# Patient Record
Sex: FEMALE | Race: WHITE | NOT HISPANIC OR LATINO | URBAN - NONMETROPOLITAN AREA
[De-identification: names, ages, dates, MRNs, and addresses within clinical notes are randomized per-mention and may not be internally consistent; named-entity substitution may affect disease eponyms.]

---

## 2017-07-27 ENCOUNTER — FOLLOW UP ESTABLISHED (OUTPATIENT)
Dept: URBAN - NONMETROPOLITAN AREA CLINIC 6 | Facility: CLINIC | Age: 27
End: 2017-07-27

## 2017-07-27 DIAGNOSIS — H52.13 MYOPIA, BILATERAL: Primary | ICD-10-CM

## 2017-07-27 PROCEDURE — S0621 ROUTINE OPHTHALMOLOGICAL EXA: HCPCS | Performed by: OPTOMETRIST

## 2017-07-27 ASSESSMENT — VISUAL ACUITY
OD: 20/20
OS: 20/20

## 2017-07-27 ASSESSMENT — INTRAOCULAR PRESSURE
OD: 19
OS: 19

## 2018-11-29 VITALS — BODY MASS INDEX: 47.09 KG/M2 | HEIGHT: 66 IN | WEIGHT: 293 LBS

## 2018-11-29 RX ORDER — SODIUM CHLORIDE 0.9 % (FLUSH) 0.9 %
5-10 SYRINGE (ML) INJECTION AS NEEDED
Status: CANCELLED | OUTPATIENT
Start: 2018-11-29

## 2018-11-29 RX ORDER — SODIUM CHLORIDE 0.9 % (FLUSH) 0.9 %
5-10 SYRINGE (ML) INJECTION EVERY 8 HOURS
Status: CANCELLED | OUTPATIENT
Start: 2018-11-29

## 2018-11-30 ENCOUNTER — HOSPITAL ENCOUNTER (OUTPATIENT)
Dept: SURGERY | Age: 28
Discharge: HOME | End: 2018-11-30

## 2018-12-01 PROBLEM — S43.431A SUPERIOR GLENOID LABRUM LESION OF RIGHT SHOULDER: Status: ACTIVE | Noted: 2018-12-01

## 2018-12-01 PROBLEM — S43.401A SPRAIN OF SHOULDER, RIGHT: Status: RESOLVED | Noted: 2018-12-01 | Resolved: 2018-12-01

## 2018-12-01 PROBLEM — S43.401A SPRAIN OF SHOULDER, RIGHT: Status: ACTIVE | Noted: 2018-12-01

## 2018-12-01 PROBLEM — S46.011A TRAUMATIC TEAR OF RIGHT ROTATOR CUFF: Status: ACTIVE | Noted: 2018-12-01

## 2018-12-01 PROBLEM — S46.111A STRAIN OF MUSCLE, FASCIA AND TENDON OF LONG HEAD OF BICEPS, RIGHT ARM, INITIAL ENCOUNTER: Status: ACTIVE | Noted: 2018-12-01

## 2018-12-01 PROBLEM — S49.91XA ACROMIOCLAVICULAR (AC) JOINT INJURY, RIGHT, INITIAL ENCOUNTER: Status: ACTIVE | Noted: 2018-12-01

## 2018-12-01 NOTE — H&P
[facility] HISTORY AND PHYSICAL Subjective:  
 
Patient is a 28 y.o. RHD FEMALE WITH RIGHT SHOULDER PAIN. SEE OFFICE NOTE. Patient Active Problem List  
 Diagnosis Date Noted  Superior glenoid labrum lesion of right shoulder 12/01/2018  Traumatic tear of right rotator cuff 12/01/2018  Strain of muscle, fascia and tendon of long head of biceps, right arm, initial encounter 12/01/2018  Acromioclavicular [facility]) joint injury, right, initial encounter 12/01/2018 No past medical history on file. No past surgical history on file. Prior to Admission medications Not on File Allergies no known allergies Social History Tobacco Use  Smoking status: Not on file Substance Use Topics  Alcohol use: Not on file No family history on file. Review of Systems A comprehensive review of systems was negative except for that written in the HPI. Objective:  
 
No data found. There were no vitals taken for this visit. General:  Alert, cooperative, no distress, appears stated age. Head:  Normocephalic, without obvious abnormality, atraumatic. Back:   Symmetric, no curvature. ROM normal. No CVA tenderness. Lungs:   Clear to auscultation bilaterally. Chest wall:  No tenderness or deformity. Heart:  Regular rate and rhythm, S1, S2 normal, no murmur, click, rub or gallop. Extremities: Extremities normal, atraumatic, no cyanosis or edema. Pulses: 2+ and symmetric all extremities. Skin: Skin color, texture, turgor normal. No rashes or lesions. Lymph nodes: Cervical, supraclavicular, and axillary nodes normal.  
Neurologic: CNII-XII intact. Normal strength, sensation and reflexes throughout. Assessment:  
 
Principal Problem: 
  Traumatic tear of right rotator cuff (12/1/2018) Active Problems: 
  Superior glenoid labrum lesion of right shoulder (12/1/2018) Strain of muscle, fascia and tendon of long head of biceps, right arm, initial encounter (12/1/2018) Acrom[facility]avicular [facility] joint injury, right, initial encounter (12/1/2018) Plan: The various methods of treatment have been discussed with the patient and family. PATIENT HAS EXHAUSTED NON-OPERATIVE MODALITIES After consideration of risks, benefits and other options for treatment, the patient has consented to surgical intervention. SEE OFFICE NOTE Janae Catalan MD

## 2018-12-03 VITALS — WEIGHT: 293 LBS | HEIGHT: 66 IN | BODY MASS INDEX: 47.09 KG/M2

## 2018-12-03 NOTE — PERIOP NOTES
Patient verified name and . Order for consent found in EHR and matches case posting; patient verifies procedure. Type 1B surgery, phone assessment complete. Orders received. Labs per surgeon: none needed pre-op. FBS & HGB-A1C dos. Labs per anesthesia protocol: none needed. Patient answered medical/surgical history questions at their best of ability. All prior to admission medications documented in [facility]. Patient instructed to take the following medications the day of surgery according to anesthesia guidelines with a small sip of water: none. Hold all vitamins 7 days prior to surgery and NSAIDS 5 days prior to surgery. Medications to be held: none. Patient instructed on the following:  Arrive at A Entrance, time of arrival to be called the day before by 1700  NPO after midnight including gum, mints, and ice chips  Responsible adult must drive patient to the hospital, stay during surgery, and patient will need supervision 24 hours after anesthesia  Use dial antibacterial soap in shower 3 nights before surgery and on the morning of surgery  All piercings must be removed prior to arrival.    Leave all valuables (money and jewelry) at home but bring insurance card and ID on DOS. Do not wear make-up, nail polish, lotions, cologne, perfumes, powders, or oil on skin. Patient teach back successful and patient demonstrates knowledge of instruction.

## 2018-12-06 ENCOUNTER — ANESTHESIA EVENT (OUTPATIENT)
Dept: SURGERY | Age: 28
End: 2018-12-06
Payer: OTHER MISCELLANEOUS

## 2018-12-07 ENCOUNTER — ANESTHESIA (OUTPATIENT)
Dept: SURGERY | Age: 28
End: 2018-12-07
Payer: OTHER MISCELLANEOUS

## 2018-12-07 ENCOUNTER — APPOINTMENT (OUTPATIENT)
Dept: GENERAL RADIOLOGY | Age: 28
End: 2018-12-07
Attending: ORTHOPAEDIC SURGERY
Payer: OTHER MISCELLANEOUS

## 2018-12-07 ENCOUNTER — HOSPITAL ENCOUNTER (OUTPATIENT)
Age: 28
Setting detail: OUTPATIENT SURGERY
Discharge: HOME | End: 2018-12-07
Attending: ORTHOPAEDIC SURGERY | Admitting: ORTHOPAEDIC SURGERY
Payer: OTHER MISCELLANEOUS

## 2018-12-07 VITALS
WEIGHT: 293 LBS | HEIGHT: 66 IN | TEMPERATURE: 97.7 F | HEART RATE: 64 BPM | RESPIRATION RATE: 15 BRPM | DIASTOLIC BLOOD PRESSURE: 63 MMHG | SYSTOLIC BLOOD PRESSURE: 107 MMHG | OXYGEN SATURATION: 100 % | BODY MASS INDEX: 47.09 KG/M2

## 2018-12-07 LAB
EST. AVERAGE GLUCOSE BLD GHB EST-MCNC: 100 MG/DL
GLUCOSE BLD STRIP.AUTO-MCNC: 90 MG/DL (ref 65–100)
HBA1C MFR BLD: 5.1 %
HCG UR QL: NEGATIVE

## 2018-12-07 PROCEDURE — 74011250636 HC RX REV CODE- 250/636: Performed by: ANESTHESIOLOGY

## 2018-12-07 PROCEDURE — 77030033073 HC TBNG ARTHSC PMP OUTFLO STRY -B: Performed by: ORTHOPAEDIC SURGERY

## 2018-12-07 PROCEDURE — 82962 GLUCOSE BLOOD TEST: CPT

## 2018-12-07 PROCEDURE — 77030003602 HC NDL NRV BLK BBMI -B: Performed by: ANESTHESIOLOGY

## 2018-12-07 PROCEDURE — 76210000016 HC OR PH I REC 1 TO 1.5 HR: Performed by: ORTHOPAEDIC SURGERY

## 2018-12-07 PROCEDURE — 74011250636 HC RX REV CODE- 250/636: Performed by: ORTHOPAEDIC SURGERY

## 2018-12-07 PROCEDURE — 77030027384 HC PRB ARTHSCP SERFAS STRY -C: Performed by: ORTHOPAEDIC SURGERY

## 2018-12-07 PROCEDURE — 83036 HEMOGLOBIN GLYCOSYLATED A1C: CPT

## 2018-12-07 PROCEDURE — 81025 URINE PREGNANCY TEST: CPT

## 2018-12-07 PROCEDURE — 77030006668 HC BLD SHV MENSCS STRY -B: Performed by: ORTHOPAEDIC SURGERY

## 2018-12-07 PROCEDURE — 77030004453 HC BUR SHV STRY -B: Performed by: ORTHOPAEDIC SURGERY

## 2018-12-07 PROCEDURE — 74011000250 HC RX REV CODE- 250: Performed by: ORTHOPAEDIC SURGERY

## 2018-12-07 PROCEDURE — 74011000250 HC RX REV CODE- 250

## 2018-12-07 PROCEDURE — 77030020782 HC GWN BAIR PAWS FLX 3M -B: Performed by: ANESTHESIOLOGY

## 2018-12-07 PROCEDURE — 74011000250 HC RX REV CODE- 250: Performed by: ANESTHESIOLOGY

## 2018-12-07 PROCEDURE — 74011250637 HC RX REV CODE- 250/637

## 2018-12-07 PROCEDURE — A4565 SLINGS: HCPCS | Performed by: ORTHOPAEDIC SURGERY

## 2018-12-07 PROCEDURE — 77030012935 HC DRSG AQUACEL BMS -B: Performed by: ORTHOPAEDIC SURGERY

## 2018-12-07 PROCEDURE — 77030002916 HC SUT ETHLN J&J -A: Performed by: ORTHOPAEDIC SURGERY

## 2018-12-07 PROCEDURE — 74011250636 HC RX REV CODE- 250/636

## 2018-12-07 PROCEDURE — 77030033005 HC TBNG ARTHSC PMP STRY -B: Performed by: ORTHOPAEDIC SURGERY

## 2018-12-07 PROCEDURE — 77030037088 HC TUBE ENDOTRACH ORAL NSL COVD-A: Performed by: ANESTHESIOLOGY

## 2018-12-07 PROCEDURE — 77030006891 HC BLD SHV RESECT STRY -B: Performed by: ORTHOPAEDIC SURGERY

## 2018-12-07 PROCEDURE — 77030018673: Performed by: ORTHOPAEDIC SURGERY

## 2018-12-07 PROCEDURE — 76060000032 HC ANESTHESIA 0.5 TO 1 HR: Performed by: ORTHOPAEDIC SURGERY

## 2018-12-07 PROCEDURE — 73030 X-RAY EXAM OF SHOULDER: CPT

## 2018-12-07 PROCEDURE — 76010000160 HC OR TIME 0.5 TO 1 HR INTENSV-TIER 1: Performed by: ORTHOPAEDIC SURGERY

## 2018-12-07 PROCEDURE — 77030003666 HC NDL SPINAL BD -A: Performed by: ORTHOPAEDIC SURGERY

## 2018-12-07 PROCEDURE — 77030039425 HC BLD LARYNG TRULITE DISP TELE -A: Performed by: ANESTHESIOLOGY

## 2018-12-07 PROCEDURE — 77030018836 HC SOL IRR NACL ICUM -A: Performed by: ORTHOPAEDIC SURGERY

## 2018-12-07 RX ORDER — NALOXONE HYDROCHLORIDE 0.4 MG/ML
0.1 INJECTION, SOLUTION INTRAMUSCULAR; INTRAVENOUS; SUBCUTANEOUS
Status: DISCONTINUED | OUTPATIENT
Start: 2018-12-07 | End: 2018-12-07 | Stop reason: HOSPADM

## 2018-12-07 RX ORDER — OXYCODONE HYDROCHLORIDE 5 MG/1
5 TABLET ORAL
Status: DISCONTINUED | OUTPATIENT
Start: 2018-12-07 | End: 2018-12-07 | Stop reason: HOSPADM

## 2018-12-07 RX ORDER — SUCCINYLCHOLINE CHLORIDE 20 MG/ML
INJECTION INTRAMUSCULAR; INTRAVENOUS AS NEEDED
Status: DISCONTINUED | OUTPATIENT
Start: 2018-12-07 | End: 2018-12-07 | Stop reason: HOSPADM

## 2018-12-07 RX ORDER — SODIUM CHLORIDE, SODIUM LACTATE, POTASSIUM CHLORIDE, CALCIUM CHLORIDE 600; 310; 30; 20 MG/100ML; MG/100ML; MG/100ML; MG/100ML
75 INJECTION, SOLUTION INTRAVENOUS CONTINUOUS
Status: DISCONTINUED | OUTPATIENT
Start: 2018-12-07 | End: 2018-12-07 | Stop reason: HOSPADM

## 2018-12-07 RX ORDER — ONDANSETRON 2 MG/ML
INJECTION INTRAMUSCULAR; INTRAVENOUS AS NEEDED
Status: DISCONTINUED | OUTPATIENT
Start: 2018-12-07 | End: 2018-12-07 | Stop reason: HOSPADM

## 2018-12-07 RX ORDER — LIDOCAINE HYDROCHLORIDE 10 MG/ML
0.1 INJECTION INFILTRATION; PERINEURAL AS NEEDED
Status: DISCONTINUED | OUTPATIENT
Start: 2018-12-07 | End: 2018-12-07 | Stop reason: HOSPADM

## 2018-12-07 RX ORDER — DIPHENHYDRAMINE HYDROCHLORIDE 50 MG/ML
12.5 INJECTION, SOLUTION INTRAMUSCULAR; INTRAVENOUS
Status: DISCONTINUED | OUTPATIENT
Start: 2018-12-07 | End: 2018-12-07 | Stop reason: HOSPADM

## 2018-12-07 RX ORDER — DEXAMETHASONE SODIUM PHOSPHATE 4 MG/ML
INJECTION, SOLUTION INTRA-ARTICULAR; INTRALESIONAL; INTRAMUSCULAR; INTRAVENOUS; SOFT TISSUE AS NEEDED
Status: DISCONTINUED | OUTPATIENT
Start: 2018-12-07 | End: 2018-12-07 | Stop reason: HOSPADM

## 2018-12-07 RX ORDER — FENTANYL CITRATE 50 UG/ML
INJECTION, SOLUTION INTRAMUSCULAR; INTRAVENOUS AS NEEDED
Status: DISCONTINUED | OUTPATIENT
Start: 2018-12-07 | End: 2018-12-07 | Stop reason: HOSPADM

## 2018-12-07 RX ORDER — PROPOFOL 10 MG/ML
INJECTION, EMULSION INTRAVENOUS AS NEEDED
Status: DISCONTINUED | OUTPATIENT
Start: 2018-12-07 | End: 2018-12-07 | Stop reason: HOSPADM

## 2018-12-07 RX ORDER — FLUMAZENIL 0.1 MG/ML
0.2 INJECTION INTRAVENOUS AS NEEDED
Status: DISCONTINUED | OUTPATIENT
Start: 2018-12-07 | End: 2018-12-07 | Stop reason: HOSPADM

## 2018-12-07 RX ORDER — NEOSTIGMINE METHYLSULFATE 1 MG/ML
INJECTION INTRAVENOUS AS NEEDED
Status: DISCONTINUED | OUTPATIENT
Start: 2018-12-07 | End: 2018-12-07 | Stop reason: HOSPADM

## 2018-12-07 RX ORDER — MIDAZOLAM HYDROCHLORIDE 1 MG/ML
2 INJECTION, SOLUTION INTRAMUSCULAR; INTRAVENOUS
Status: COMPLETED | OUTPATIENT
Start: 2018-12-07 | End: 2018-12-07

## 2018-12-07 RX ORDER — MIDAZOLAM HYDROCHLORIDE 1 MG/ML
2 INJECTION, SOLUTION INTRAMUSCULAR; INTRAVENOUS ONCE
Status: COMPLETED | OUTPATIENT
Start: 2018-12-07 | End: 2018-12-07

## 2018-12-07 RX ORDER — LABETALOL HYDROCHLORIDE 5 MG/ML
INJECTION, SOLUTION INTRAVENOUS AS NEEDED
Status: DISCONTINUED | OUTPATIENT
Start: 2018-12-07 | End: 2018-12-07 | Stop reason: HOSPADM

## 2018-12-07 RX ORDER — LIDOCAINE HYDROCHLORIDE AND EPINEPHRINE 10; 10 MG/ML; UG/ML
INJECTION, SOLUTION INFILTRATION; PERINEURAL AS NEEDED
Status: DISCONTINUED | OUTPATIENT
Start: 2018-12-07 | End: 2018-12-07 | Stop reason: HOSPADM

## 2018-12-07 RX ORDER — GLYCOPYRROLATE 0.2 MG/ML
INJECTION INTRAMUSCULAR; INTRAVENOUS AS NEEDED
Status: DISCONTINUED | OUTPATIENT
Start: 2018-12-07 | End: 2018-12-07 | Stop reason: HOSPADM

## 2018-12-07 RX ORDER — SODIUM CHLORIDE 0.9 % (FLUSH) 0.9 %
5-10 SYRINGE (ML) INJECTION EVERY 8 HOURS
Status: DISCONTINUED | OUTPATIENT
Start: 2018-12-07 | End: 2018-12-07 | Stop reason: HOSPADM

## 2018-12-07 RX ORDER — ROCURONIUM BROMIDE 10 MG/ML
INJECTION, SOLUTION INTRAVENOUS AS NEEDED
Status: DISCONTINUED | OUTPATIENT
Start: 2018-12-07 | End: 2018-12-07 | Stop reason: HOSPADM

## 2018-12-07 RX ORDER — LIDOCAINE HYDROCHLORIDE 20 MG/ML
INJECTION, SOLUTION EPIDURAL; INFILTRATION; INTRACAUDAL; PERINEURAL AS NEEDED
Status: DISCONTINUED | OUTPATIENT
Start: 2018-12-07 | End: 2018-12-07 | Stop reason: HOSPADM

## 2018-12-07 RX ORDER — HYDROMORPHONE HYDROCHLORIDE 2 MG/ML
0.5 INJECTION, SOLUTION INTRAMUSCULAR; INTRAVENOUS; SUBCUTANEOUS
Status: DISCONTINUED | OUTPATIENT
Start: 2018-12-07 | End: 2018-12-07 | Stop reason: HOSPADM

## 2018-12-07 RX ORDER — SODIUM CHLORIDE 0.9 % (FLUSH) 0.9 %
5-10 SYRINGE (ML) INJECTION AS NEEDED
Status: DISCONTINUED | OUTPATIENT
Start: 2018-12-07 | End: 2018-12-07 | Stop reason: HOSPADM

## 2018-12-07 RX ORDER — OXYCODONE HYDROCHLORIDE 5 MG/1
10 TABLET ORAL
Status: DISCONTINUED | OUTPATIENT
Start: 2018-12-07 | End: 2018-12-07 | Stop reason: HOSPADM

## 2018-12-07 RX ORDER — FENTANYL CITRATE 50 UG/ML
100 INJECTION, SOLUTION INTRAMUSCULAR; INTRAVENOUS ONCE
Status: COMPLETED | OUTPATIENT
Start: 2018-12-07 | End: 2018-12-07

## 2018-12-07 RX ORDER — ALBUTEROL SULFATE 90 UG/1
AEROSOL, METERED RESPIRATORY (INHALATION) AS NEEDED
Status: DISCONTINUED | OUTPATIENT
Start: 2018-12-07 | End: 2018-12-07 | Stop reason: HOSPADM

## 2018-12-07 RX ADMIN — FENTANYL CITRATE 100 MCG: 50 INJECTION, SOLUTION INTRAMUSCULAR; INTRAVENOUS at 10:52

## 2018-12-07 RX ADMIN — LIDOCAINE HYDROCHLORIDE 50 MG: 20 INJECTION, SOLUTION EPIDURAL; INFILTRATION; INTRACAUDAL; PERINEURAL at 10:43

## 2018-12-07 RX ADMIN — LABETALOL HYDROCHLORIDE 10 MG: 5 INJECTION, SOLUTION INTRAVENOUS at 10:57

## 2018-12-07 RX ADMIN — MIDAZOLAM HYDROCHLORIDE 2 MG: 1 INJECTION, SOLUTION INTRAMUSCULAR; INTRAVENOUS at 10:41

## 2018-12-07 RX ADMIN — MIDAZOLAM 2 MG: 1 INJECTION INTRAMUSCULAR; INTRAVENOUS at 09:08

## 2018-12-07 RX ADMIN — PROPOFOL 300 MG: 10 INJECTION, EMULSION INTRAVENOUS at 10:43

## 2018-12-07 RX ADMIN — DEXAMETHASONE SODIUM PHOSPHATE 10 MG: 4 INJECTION, SOLUTION INTRA-ARTICULAR; INTRALESIONAL; INTRAMUSCULAR; INTRAVENOUS; SOFT TISSUE at 10:55

## 2018-12-07 RX ADMIN — FAMOTIDINE 20 MG: 10 INJECTION, SOLUTION INTRAVENOUS at 08:28

## 2018-12-07 RX ADMIN — SODIUM CHLORIDE, SODIUM LACTATE, POTASSIUM CHLORIDE, AND CALCIUM CHLORIDE 75 ML/HR: 600; 310; 30; 20 INJECTION, SOLUTION INTRAVENOUS at 08:29

## 2018-12-07 RX ADMIN — GLYCOPYRROLATE 0.8 MG: 0.2 INJECTION INTRAMUSCULAR; INTRAVENOUS at 11:20

## 2018-12-07 RX ADMIN — ONDANSETRON 4 MG: 2 INJECTION INTRAMUSCULAR; INTRAVENOUS at 10:55

## 2018-12-07 RX ADMIN — ALBUTEROL SULFATE 3 PUFF: 90 AEROSOL, METERED RESPIRATORY (INHALATION) at 11:29

## 2018-12-07 RX ADMIN — FENTANYL CITRATE 100 MCG: 50 INJECTION INTRAMUSCULAR; INTRAVENOUS at 09:08

## 2018-12-07 RX ADMIN — ROCURONIUM BROMIDE 35 MG: 10 INJECTION, SOLUTION INTRAVENOUS at 10:51

## 2018-12-07 RX ADMIN — ROCURONIUM BROMIDE 5 MG: 10 INJECTION, SOLUTION INTRAVENOUS at 10:43

## 2018-12-07 RX ADMIN — Medication 3 G: at 10:41

## 2018-12-07 RX ADMIN — SUCCINYLCHOLINE CHLORIDE 200 MG: 20 INJECTION INTRAMUSCULAR; INTRAVENOUS at 10:43

## 2018-12-07 RX ADMIN — PROPOFOL 100 MG: 10 INJECTION, EMULSION INTRAVENOUS at 10:47

## 2018-12-07 RX ADMIN — NEOSTIGMINE METHYLSULFATE 5 MG: 1 INJECTION INTRAVENOUS at 11:20

## 2018-12-07 NOTE — ANESTHESIA PROCEDURE NOTES
Peripheral Block Start time: 12/7/2018 9:09 AM 
End time: 12/7/2018 9:13 AM 
Performed by: India Grant MD 
Authorized by: India Grant MD  
 
 
Pre-procedure: Indications: at surgeon's request and post-op pain management Preanesthetic Checklist: patient identified, risks and benefits discussed, site marked, timeout performed, anesthesia consent given and patient being monitored Timeout Time: 09:08 Block Type:  
Block Type: Interscalene Laterality:  Right Monitoring:  Standard ASA monitoring, continuous pulse ox, frequent vital sign checks, heart rate, oxygen and responsive to questions Injection Technique:  Single shot Procedures: ultrasound guided and nerve stimulator Patient Position: supine (30 degree upright) Prep: chlorhexidine Location:  Interscalene Needle Type:  Stimuplex Needle Gauge:  21 G Needle Localization:  Nerve stimulator and ultrasound guidance Motor Response: minimal motor response >0.4 mA Motor Response comment:  Motor twitch extinguished between 0.2-0.5 mA Assessment: 
Number of attempts:  1 Injection Assessment:  Incremental injection every 5 mL, negative aspiration for CSF, no paresthesia, ultrasound image on chart, local visualized surrounding nerve on ultrasound, negative aspiration for blood and no intravascular symptoms Patient tolerance:  Patient tolerated the procedure well with no immediate complications

## 2018-12-07 NOTE — ANESTHESIA POSTPROCEDURE EVALUATION
Procedure(s): ARTHROSCOPY RIGHT SHOULDER ARTHROSCOPIC DISTAL CLAVICLE RESECTION, EXTENSIVE DEBRIDEMENT GLENOHUMERAL JOINT, SUBACROMIAL SPACE    . Anesthesia Post Evaluation Multimodal analgesia: multimodal analgesia used between 6 hours prior to anesthesia start to PACU discharge Patient location during evaluation: PACU Patient participation: complete - patient participated Level of consciousness: awake Pain management: adequate Airway patency: patent Anesthetic complications: no 
Cardiovascular status: acceptable and hemodynamically stable Respiratory status: acceptable Hydration status: acceptable Comments: Acceptable for discharge from PACU. Visit Vitals /63 Pulse 64 Temp 36.5 °C (97.7 °F) Resp 15 Ht 5' 6\" (1.676 m) Wt (!) 167.4 kg (369 lb) SpO2 100% BMI 59.56 kg/m²

## 2018-12-07 NOTE — ANESTHESIA PREPROCEDURE EVALUATION
Anesthetic History No history of anesthetic complications Review of Systems / Medical History Patient summary reviewed and pertinent labs reviewed Pulmonary Asthma (no meds) : well controlled Neuro/Psych Within defined limits Cardiovascular Within defined limits Exercise tolerance: >4 METS 
  
GI/Hepatic/Renal 
Within defined limits Endo/Other Morbid obesity Other Findings Physical Exam 
 
Airway Mallampati: II 
TM Distance: > 6 cm Neck ROM: normal range of motion Mouth opening: Normal 
 
 Cardiovascular Rhythm: regular Rate: normal 
 
 
 
 Dental 
No notable dental hx Pulmonary Breath sounds clear to auscultation Abdominal 
 
 
 
 Other Findings Anesthetic Plan ASA: 3 Anesthesia type: general 
 
 
Post-op pain plan if not by surgeon: peripheral nerve block single Anesthetic plan and risks discussed with: Patient

## 2018-12-07 NOTE — DISCHARGE INSTRUCTIONS
INSTRUCTIONS FOLLOWING ARTHROSCOPY SURGERY  Dr. Christa Lobato [phone]    ACTIVITY   As tolerated and as directed by your doctor   Elevate surgery site first 48 hours.  Use arm sling or crutches per your doctors instructions.  Bathe or shower as directed by your doctor. DIET   Clear liquids until no nausea or vomiting; then light diet for the first day   Advance to regular diet on second day, unless your doctor orders otherwise.  If nausea and vomiting continues, call your doctor. PAIN   Take pain medication as directed by your doctor.  Call your doctor if pain is NOT relieved by medication.  DO NOT take aspirin or blood thinners until directed by your doctor. DRESSING CARE: Follow all dressing care instructions provided by Dr. Radha Meraz will be made by nursing staff.  If you have any problems or concerns, call your doctor as needed. CALL YOUR DOCTOR IF   Excessive bleeding that does not stop after holding mild pressure over the area   Temperature of 101°F or above   Redness, excessive swelling or bruising, and/or green or yellow, smelly discharge from incision    AFTER ANESTHESIA   For the next 24 hours: DO NOT Drive, Drink alcoholic beverages, or Make important decisions.  Be aware of dizziness following anesthesia and while taking pain medication.

## 2018-12-07 NOTE — H&P
Date of Surgery Update: 
Riley Murphy was seen and examined. History and physical has been reviewed. The patient has been examined.  There have been no significant clinical changes since the completion of the originally dated History and Physical. 
 
Signed By: Azul Morales MD   
 December 7, 2018 7:40 AM

## 2018-12-07 NOTE — OP NOTES
[address] [place] [address] REPORT    Rocky Haque  MR#: [id number]  : 1990  ACCOUNT #: [de-identified]   DATE OF SERVICE: 2018    PREOPERATIVE DIAGNOSES:  1. Partial thickness rotator cuff tear, right shoulder. 2.  Bicipital strain, right shoulder. 3.  Superior labrum anterior and posterior tear, right shoulder. 4.  Acromioclavicular joint pain, right shoulder. 5.  Acromioclavicular joint sprain, right shoulder. POSTOPERATIVE DIAGNOSES:  1. Rotator cuff tendonitis, right shoulder. 2.  Acromioclavicular joint sprain, right shoulder. PROCEDURE PERFORMED:  Arthroscopy, right shoulder, arthroscopic distal clavicle resection, extensive debridement of glenohumeral joint and subacromial space. SURGEON:  Melvyn Kussmaul. Sita Kirkland MD      PATHOLOGY:  1. Type 1 acromion. 2.  Acromioclavicular joint sprain. CPT CODES:  [id number], [id number]. ICD-10 CODES:  P20.345 and S49.91X. ANESTHESIA:  General with interscalene block. ESTIMATED BLOOD LOSS:  10 mL. COMPLICATIONS:  None. INDICATIONS:  The patient is a 74-year-old, right-hand dominant female who injured her right shoulder on the job. Preoperative physical examination, radiographs, and MRI demonstrate what appears to be a partial-thickness rotator cuff tear, bicipital strain, SLAP tear, and AC joint pain, right shoulder. Patient has exhausted nonoperative modalities and is electively admitted for operative intervention. The patient is 370 pounds. PROCEDURE:  Following identification of the patient, the patient was taken to the operative suite. Following administration of general anesthesia, interscalene block for postop pain control, 3 grams of IV Ancef, and measurement of a hemoglobin A1c and fasting blood glucose at 5.1 and 90 respectively, the patient was then positioned on the operative table in the supine fashion.   Her right shoulder was examined under anesthesia and noted to be stable through full range of motion. There was no instability. The patient was then very carefully positioned in the lateral decubitus position, left side down. Axillary roll was placed. Beanbag was inflated. Care was taken to pad both dependent lower and upper extremities. Right arm was then placed in the Certeon traction device in 15 pounds of traction. Right shoulder was then prepped and draped in a sterile fashion. Subacromial space was then injected with 10 mL of 1% Xylocaine with epinephrine. Scope was introduced in the shoulder. Diagnostic arthroscopy then commenced. The articular surfaces of the humeral head and glenoid were visualized and noted to be intact. Anterior, posterior, superior, and inferior labrum was visualized. The anterior, posterior, and inferior bands of the IGHL were intact. The superior labrum was visualized in its entirety and was intact. There was no evidence of any SLAP tear. The biceps itself was pristine and intact. Undersurface of the rotator cuff was visualized including subscapularis, supra- and infraspinatus. Teres minor was intact. The scope was then flip-flopped from the posterior to anterior portal.  Posterior cuff and labrum were intact. Scope was introduced into the subacromial space. Lateral portal was then established. Hypertrophic hemorrhagic bursal tissue was then resected. Bursal side of the cuff was visualized. There was no evidence of full-thickness bursal cuff tear. There was significant hypertrophic, hemorrhagic bursal tissue. CA ligament was pristine. With the use of a 5.54 resector, a complete bursectomy was then performed. Again, the rotator cuff was intact. At this point, attention was then turned to resecting the distal clavicle. The distal 10 mm of distal clavicle was then resected. Care was taken to preserve the posterior-superior capsule. At this point, with the procedure complete, arthroscopic equipment was removed from the shoulder.   The portals were approximated using 2-0 nylon horizontal mattress sutures. Aquacel dressing was applied. A Cryo/Cuff and swathe were applied. The patient was then transferred to the recovery room in stable condition. This injury was secondary to a workplace injury.       MD MARNIE Costa / BOOGIE  D: 12/07/2018 11:39     T: 12/07/2018 15:41  JOB #: [id number]  CC: Brissa Way MD

## 2018-12-10 ENCOUNTER — HOSPITAL ENCOUNTER (OUTPATIENT)
Dept: PHYSICAL THERAPY | Age: 28
End: 2018-12-10
Payer: COMMERCIAL

## 2018-12-13 ENCOUNTER — HOSPITAL ENCOUNTER (OUTPATIENT)
Dept: PHYSICAL THERAPY | Age: 28
Discharge: HOME | End: 2018-12-13
Payer: COMMERCIAL

## 2018-12-13 PROCEDURE — 97140 MANUAL THERAPY 1/> REGIONS: CPT

## 2018-12-13 PROCEDURE — 97161 PT EVAL LOW COMPLEX 20 MIN: CPT

## 2018-12-13 PROCEDURE — 97110 THERAPEUTIC EXERCISES: CPT

## 2018-12-13 NOTE — THERAPY EVALUATION
[employer]  : 1990  Payor: [address] [place] [address] Road / Plan: [employer] [place] / Product Type: Workers Comp /  [address]  at [facility] CAIO M[employer]  [address] [place] [address], [address]acker.  [phone]   [phone]       Carlos Manuel 52 Assessment 2018     ICD-10: Treatment Diagnosis: Pain in right shoulder (M25.511), Stiffness of right shoulder, not elsewhere classified (M25.611)  Precautions/Allergies:   Patient has no known allergies. Fall Risk Score: 0 (? 5 = High Risk)  MD Orders: Evaluate and treat, strengthening, range of motion, home exercise program, \"\"full motion, full strength, no restrictions\"  MEDICAL/REFERRING DIAGNOSIS:  S/P RT Shoulder Arthroscopy, Arthroscopic distal clavicle resection   DATE OF ONSET: 18 (surgery), 3/6/18 (injury)  REFERRING PHYSICIAN: Nadia Sun MD  RETURN PHYSICIAN APPOINTMENT: 18     INITIAL ASSESSMENT:  Ms. Ann-Marie Almazan 6 days s/p R shoulder arthroscopy with distal clavicle resection and debridement of glenohumeral joint. Post-op pain, swelling, wound healing, weakness and decreased ROM are limiting normalized use and function of R UE. She will benefit from PT for guided shoulder rehab per MD order guidelines to promote safe return to normalized use of the UE with daily functional activities. PROBLEM LIST (Impacting functional limitations):  1. Decreased [place] with Home Exercise Program  2. Post-op R shoulder pain and swelling  3. Decreased R shoulder ROM   4. Weakness R shoulder INTERVENTIONS PLANNED:  1. Thermal and electric modalities, manual therapies for pain   2. Manual therapies, therapeutic exercises, HEP for ROM    3. Therapeutic exercises and HEP for strength   TREATMENT PLAN:  Effective Dates: 2018 TO 2018.  Frequency/Duration: 2-3 visits per week for 8 weeks (in anticipation of additional visits ordered by physician at follow up appointments)  GOALS: (Goals have been discussed and agreed upon with patient.)  Short-Term Functional Goals: Time Frame: 4 weeks  1. Report no more than 2-3/10 intermittent pain to R shoulder with compensatory use during basic functional activities, and score less than 60% on the DASH. 2. R shoulder PROM forward elevation greater than 160 degrees and external rotation greater than 90 degrees to progress into functional ranges. 3. Demonstrate good R shoulder isometric strength with manual testing to progress into strength phase. 4. Independent with initial HEP. Discharge Goals: Time Frame: 8 weeks  1. No more than 2-3/10 intermittent pain R shoulder with return to normalized household and work activities, and score less than 20% on the DASH. 2. R shoulder AROM forward elevation greater than 160 degrees, active external rotation greater than T1-2 with functional reaching, and strength to shoulder are grossly WNL's for safe use with normalized activities. 3. Demonstrate good functional shoulder strength and endurance for return to normalized household and work activities. 4. Independent with advanced shoulder HEP for continued self-management. Rehabilitation Potential For Stated Goals: GOOD    Regarding Antonia Love's therapy, I certify that the treatment plan above will be carried out by a therapist or under their direction. Thank you for this referral,    Fracisco Scott PT                   The information in this section was collected on 12-13-18 (except where otherwise noted). HISTORY:   History of Present Injury/Illness (Reason for Referral): Pt reports that she injured her R shoulder while at work on March 6, 2018 when she lifted a box and felt sudden pain in her R shoulder. Attended 12 visits of physical therapy with no relief, and underwent surgery on 12/6/18. Past Medical History/Comorbidities: Ms. Troy Whatley  has a past medical history of Asthma, BMI 50.0-59.9, adult ([address] and Former smoker.   Ms. Troy Whatley  has a past surgical history that includes hx dilation and curettage (2017) and ARTHROSCOPY RIGHT SHOULDER ARTHROSCOPIC DISTAL CLAVICLE RESECTION, EXTENSIVE DEBRIDEMENT GLENOHUMERAL JOINT, SUBACROMIAL SPACE     (Right, 12/7/2018). Social History/Living Environment:  Patient lives alone in a 1-story house usually, but is staying with her mother and siblings in a two-story house while recovering from this surgery. Prior Level of Function/Work/Activity: Patient previously worked full-time for [employer] at [employer]. 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 Dominant Side:         RIGHT  Current Medications:     -Promethazine 25 mg - nausea  - Ketorolac 10 MG - pain  - Hydromorphone 2 MG - pain  - Temazepam 15 MG - sleeping   Date Last Reviewed:  12/13/18   Number of Personal Factors/Comorbidities that affect the Plan of Care: 1-2: MODERATE COMPLEXITY   EXAMINATION:   12-13-18  Observation/Orthostatic Postural Assessment:  Surgical wound to R shoulder appears to be healing well. Sutures are intact, wound edges approximated, and there is no drainage or redness noted. Incisions to R shoulder covered by adhesive bandages but no drainage noticed through the bandage. Arrived to PT without sling on R UE, with R UE in in adducted, internally rotated guarded position with elbow flexion at rest.     Palpation:  Tenderness to R anterior deltoid.      ROM:    L shoulder AROM (standing)   Flexion: 162 degrees   Abduction: 170 degrees   IR with reaching: T5   ER with reaching: T2              R shoulder AROM (standing)   Flexion: 20 degrees      R shoulder PROM (supine)   Flexion: 120 degrees   ABD: 90 degrees   ER (at 45 deg ABD): 60 degrees   IR to beltline         Strength:    L shoulder   Flex 5/5   ABD 5/5   IR (standing, elbow at side) 5/5   ER (standing, elbow at side) 5/5     R shoulder   Isometrics in supine, 4/5 for flexion   Grossly 4/5 for ER/IR with elbow at side         Special Tests: None  Neurological Screen: Motor and sensory to L UE intact. Functional Mobility:  Sit to/from Stand: independent. Bed Mobility: independent. Independent with basic mobility. Requires mod-max A with dressing and grooming ADL's per patient report. Balance:  Not tested        Body Structures Involved:  1. Nerves  2. Bones  3. Joints  4. Muscles Body Functions Affected:  1. Sensory/Pain  2. Neuromusculoskeletal  3. Movement Related  4. Skin Related Activities and Participation Affected:  1. Self Care  2. Interpersonal Interactions and Relationships  3. Community, Social and [place]   Number of elements (examined above) that affect the Plan of Care: 4+: HIGH COMPLEXITY   CLINICAL PRESENTATION:   Presentation: Stable and uncomplicated: LOW COMPLEXITY   CLINICAL DECISION MAKING:   Outcome Measure: Tool Used: Disabilities of the Arm, Shoulder and Hand (DASH) Questionnaire - Quick Version  Score:  Initial: 53/55 or 95% limited (12-13-18) Most Recent: X/55 (Date: -- )   Interpretation of Score: The DASH is designed to measure the activities of daily living in person's with upper extremity dysfunction or pain. Each section is scored on a 1-5 scale, 5 representing the greatest disability. The scores of each section are added together for a total score of 55. This number is divided by 11, followed by subtracting 1 and multiplying by 25 to get a percent score of disability. This value represents the percentage disability: 0-20% minimal disability; 20-40% moderate disability; 40-60% severe disability; % dependent for care or exaggerated symptom behavior. Minimal detectable change is 12%. Score 11 12-19 20-28 29-37 38-45 46-54 55   Modifier CH CI CJ CK CL CM CN     Medical Necessity:   · Skilled intervention continues to be required due to R shoulder pain s/p R shoulder arthroscopy. Reason for Services/Other Comments:  · Patient continues to require skilled intervention due to R shoulder pain s/p R shoulder arthroscopy.    Use of outcome tool(s) and clinical judgement create a POC that gives a: Clear prediction of patient's progress: LOW COMPLEXITY            TREATMENT:   (In addition to Assessment/Re-Assessment sessions the following treatments were rendered)  Pre-treatment Symptoms/Complaints:  States that she has been in a lot of pain since the surgery, and has been living with her mother for assistance with ADLs. Has been sleeping in a recliner but has found it difficult to sleep. Pain: Initial:   6/10 Post Session:  No VAS, denied ice after PT opting to ice at home     Manual Therapy (15 minutes) to improve R shoulder ROM. Grade 2-3 physio mobilizations in supine for flexion, abduction, IR and ER performed with R UE fully supported in scapular plane. Therapeutic Exercise ( 9 minutes) to improve R upper extremity function. Isometrics performed for flexion, abduction, IR and ER performed in supine in scapular plane. Reviewed patient's HEP and instructed patient on closed-chain walk-backs for improved shoulder flexion and isometrics in standing for improved R UE muscle activation. HEP: Provided written HEP for isometrics, wall walks/slides, scapular retractions and elbow flexion/extension and also use of ice for pain and swelling. . Patient verbalizes understanding. Treatment/Session Assessment:    · Response to Treatment:  Patient very guarded initially with passive motion to R shoulder but this declined and patient was able to attain functional passive ranges during PT.  · Compliance with Program/Exercises: Will assess as treatment progresses  · Recommendations/Intent for next treatment session: \"Next visit will focus on improving strength and function of R UE\".    Total Treatment Duration: 45 Minutes  PT Patient Time In/Time Out  Time In: 1415  Time Out: [facility], PT

## 2018-12-14 NOTE — PROGRESS NOTES
Ambulatory/Rehab Services H2 Model Falls Risk Assessment    Risk Factors:       No Risk Factors Identified Ability to Rise from Chair:       (0)  Ability to rise in a single movement    Falls Prevention Plan:       No modifications necessary   Total: (5 or greater = High Risk): 0    ©2010 [facility] of [employer]. All Rights Reserved. [facility] Patent #[id number].  Federal Law prohibits the replication, distribution or use without written permission from [facility] [employer]

## 2018-12-20 ENCOUNTER — HOSPITAL ENCOUNTER (OUTPATIENT)
Dept: PHYSICAL THERAPY | Age: 28
Discharge: HOME | End: 2018-12-20
Payer: COMMERCIAL

## 2018-12-20 PROCEDURE — 97140 MANUAL THERAPY 1/> REGIONS: CPT

## 2018-12-20 PROCEDURE — 97110 THERAPEUTIC EXERCISES: CPT

## 2018-12-20 NOTE — PROGRESS NOTES
[employer]  : 1990  Payor: [address] [place] [address] Road / Plan: David Iglesias / Product Type: Workers Comp /  [address]  at [facility]  [address] [place] [address], 4 Tila Thacker.  [phone]   [phone]       OUTPATIENT PHYSICAL [address] Note 2018     ICD-10: Treatment Diagnosis: Pain in right shoulder (M25.511), Stiffness of right shoulder, not elsewhere classified (M25.611)  Precautions/Allergies:   Patient has no known allergies. Fall Risk Score: 0 (? 5 = High Risk)  MD Orders: Evaluate and treat, strengthening, range of motion, home exercise program, \"\"full motion, full strength, no restrictions\"  MEDICAL/REFERRING DIAGNOSIS:  S/P RT Shoulder Arthroscopy, Arthroscopic distal clavicle resection   DATE OF ONSET: 18 (surgery), 3/6/18 (injury)  REFERRING PHYSICIAN: Ike Barry MD  RETURN PHYSICIAN APPOINTMENT: 2019     INITIAL ASSESSMENT:  Ms. Jim Olszewski 6 days s/p R shoulder arthroscopy with distal clavicle resection and debridement of glenohumeral joint. Post-op pain, swelling, wound healing, weakness and decreased ROM are limiting normalized use and function of R UE. She will benefit from PT for guided shoulder rehab per MD order guidelines to promote safe return to normalized use of the UE with daily functional activities. PROBLEM LIST (Impacting functional limitations):  1. Decreased [place] with Home Exercise Program  2. Post-op R shoulder pain and swelling  3. Decreased R shoulder ROM   4. Weakness R shoulder INTERVENTIONS PLANNED:  1. Thermal and electric modalities, manual therapies for pain   2. Manual therapies, therapeutic exercises, HEP for ROM    3. Therapeutic exercises and HEP for strength   TREATMENT PLAN:  Effective Dates: 2018 TO 2018.  Frequency/Duration: 2-3 visits per week for 8 weeks (in anticipation of additional visits ordered by physician at follow up appointments)  GOALS: (Goals have been discussed and agreed upon with patient.)  Short-Term Functional Goals: Time Frame: 4 weeks  1. Report no more than 2-3/10 intermittent pain to R shoulder with compensatory use during basic functional activities, and score less than 60% on the DASH. 2. R shoulder PROM forward elevation greater than 160 degrees and external rotation greater than 90 degrees to progress into functional ranges. 3. Demonstrate good R shoulder isometric strength with manual testing to progress into strength phase. 4. Independent with initial HEP. Discharge Goals: Time Frame: 8 weeks  1. No more than 2-3/10 intermittent pain R shoulder with return to normalized household and work activities, and score less than 20% on the DASH. 2. R shoulder AROM forward elevation greater than 160 degrees, active external rotation greater than T1-2 with functional reaching, and strength to shoulder are grossly WNL's for safe use with normalized activities. 3. Demonstrate good functional shoulder strength and endurance for return to normalized household and work activities. 4. Independent with advanced shoulder HEP for continued self-management. Rehabilitation Potential For Stated Goals: GOOD                The information in this section was collected on 12-13-18 (except where otherwise noted). HISTORY:   History of Present Injury/Illness (Reason for Referral): Pt reports that she injured her R shoulder while at work on March 6, 2018 when she lifted a box and felt sudden pain in her R shoulder. Attended 12 visits of physical therapy with no relief, and underwent surgery on 12/6/18. Past Medical History/Comorbidities: Ms. Triston Gomes  has a past medical history of Asthma, BMI 50.0-59.9, adult ([address] and Former smoker. Ms. Triston Gomes  has a past surgical history that includes hx dilation and curettage (2017) and ARTHROSCOPY RIGHT SHOULDER ARTHROSCOPIC DISTAL CLAVICLE RESECTION, EXTENSIVE DEBRIDEMENT GLENOHUMERAL JOINT, SUBACROMIAL SPACE     (Right, 12/7/2018).   Social History/Living Environment:  Patient lives alone in a 1-story house usually, but is staying with her mother and siblings in a two-story house while recovering from this surgery. Prior Level of Function/Work/Activity: Patient previously worked full-time for [employer] at [employer]. 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 Dominant Side:         RIGHT  Current Medications:     -Promethazine 25 mg - nausea  - Ketorolac 10 MG - pain  - Hydromorphone 2 MG - pain  - Temazepam 15 MG - sleeping   Date Last Reviewed:  12/13/18   Number of Personal Factors/Comorbidities that affect the Plan of Care: 1-2: MODERATE COMPLEXITY   EXAMINATION:   12-13-18  Observation/Orthostatic Postural Assessment:  Surgical wound to R shoulder appears to be healing well. Sutures are intact, wound edges approximated, and there is no drainage or redness noted. Incisions to R shoulder covered by adhesive bandages but no drainage noticed through the bandage. Arrived to PT without sling on R UE, with R UE in in adducted, internally rotated guarded position with elbow flexion at rest.     Palpation:  Tenderness to R anterior deltoid. ROM:    L shoulder AROM (standing)   Flexion: 162 degrees   Abduction: 170 degrees   IR with reaching: T5   ER with reaching: T2              R shoulder AROM (standing)   Flexion: 20 degrees      R shoulder PROM (supine)   Flexion: 120 degrees   ABD: 90 degrees   ER (at 45 deg ABD): 60 degrees   IR to beltline         Strength:    L shoulder   Flex 5/5   ABD 5/5   IR (standing, elbow at side) 5/5   ER (standing, elbow at side) 5/5     R shoulder   Isometrics in supine, 4/5 for flexion   Grossly 4/5 for ER/IR with elbow at side         Special Tests: None  Neurological Screen: Motor and sensory to L UE intact. Functional Mobility:  Sit to/from Stand: independent. Bed Mobility: independent. Independent with basic mobility.   Requires mod-max A with dressing and grooming ADL's per patient report. Balance:  Not tested        Body Structures Involved:  1. Nerves  2. Bones  3. Joints  4. Muscles Body Functions Affected:  1. Sensory/Pain  2. Neuromusculoskeletal  3. Movement Related  4. Skin Related Activities and Participation Affected:  1. Self Care  2. Interpersonal Interactions and Relationships  3. Community, Social and [place]   Number of elements (examined above) that affect the Plan of Care: 4+: HIGH COMPLEXITY   CLINICAL PRESENTATION:   Presentation: Stable and uncomplicated: LOW COMPLEXITY   CLINICAL DECISION MAKING:   Outcome Measure: Tool Used: Disabilities of the Arm, Shoulder and Hand (DASH) Questionnaire - Quick Version  Score:  Initial: 53/55 or 95% limited (12-13-18) Most Recent: X/55 (Date: -- )   Interpretation of Score: The DASH is designed to measure the activities of daily living in person's with upper extremity dysfunction or pain. Each section is scored on a 1-5 scale, 5 representing the greatest disability. The scores of each section are added together for a total score of 55. This number is divided by 11, followed by subtracting 1 and multiplying by 25 to get a percent score of disability. This value represents the percentage disability: 0-20% minimal disability; 20-40% moderate disability; 40-60% severe disability; % dependent for care or exaggerated symptom behavior. Minimal detectable change is 12%. Score 11 12-19 20-28 29-37 38-45 46-54 55   Modifier CH CI CJ CK CL CM CN     Medical Necessity:   · Skilled intervention continues to be required due to R shoulder pain s/p R shoulder arthroscopy. Reason for Services/Other Comments:  · Patient continues to require skilled intervention due to R shoulder pain s/p R shoulder arthroscopy.    Use of outcome tool(s) and clinical judgement create a POC that gives a: Clear prediction of patient's progress: LOW COMPLEXITY            TREATMENT:   (In addition to Assessment/Re-Assessment sessions the following treatments were rendered)  Pre-treatment Symptoms/Complaints: Had stitches removed yesterday at visit with Dr. Sonali Nunez. Missed her previous treatment due to transportation issues, but reports that those have been resolved. Pain: Initial:   7/10 Post Session:  7/10, provided with ice at end of PT     Manual Therapy (10 minutes) to improve R shoulder ROM. Grade 3-4 physio mobilizations in supine for flexion, abduction, IR and ER performed with R UE fully supported in scapular plane. Therapeutic Exercise ( 30 minutes) to improve R upper extremity function. Isometrics performed for flexion, abduction, IR and ER performed in supine in scapular plane. Reviewed patient's HEP and instructed patient on closed-chain walk-backs for improved shoulder flexion and isometrics in standing for improved R UE muscle activation. Scapular diagonals with active-assisted motion from therapist, no manual resistance applied. Date:  12-20-18 Date:   Date:     Activity/Exercise Parameters Parameters Parameters   Flexion Supine x 10 through full ROM    Semi-reclined  2 x 10 active     Supine press 0# 3 x 10     External rotations 0# x 10  2# 2 x 12       Abduction Side-lying 3 x 10, 0#     Prone rows 3 x 10 0#     Scapular retractions Blue, elbows flex 2 x 15 B    Black, elbows ext  2 x 10 B    Prone extension, 3 x 10      AAROM UE [place] 2 x 10    Wall slides, forearm neutral 2 x 10            HEP: No changes to HEP    Treatment/Session Assessment:    · Response to Treatment:  Pt demonstrates significantly improved ROM as opposed to initial evaluation. Tolerated all strengthening moves well with only minimal discomfort. · Compliance with Program/Exercises: Compliant  · Recommendations/Intent for next treatment session: \"Next visit will focus on improving strength and function of R UE\".    Total Treatment Duration: 40 Minutes  PT Patient Time In/Time Out  Time In: 1045  Time Out: [address], PT

## 2018-12-21 ENCOUNTER — HOSPITAL ENCOUNTER (OUTPATIENT)
Dept: PHYSICAL THERAPY | Age: 28
Discharge: HOME | End: 2018-12-21
Payer: COMMERCIAL

## 2018-12-21 PROCEDURE — 97140 MANUAL THERAPY 1/> REGIONS: CPT

## 2018-12-21 PROCEDURE — 97110 THERAPEUTIC EXERCISES: CPT

## 2018-12-21 NOTE — PROGRESS NOTES
[employer]  : 1990  Payor: [address] [place] [address] Road / Plan: Jarad Begin / Product Type: Workers Comp /  [address]  at [facility] [employer]  [address] [place] [address], 4 Tila Th[address].  [phone]   [phone]       OUTPATIENT PHYSICAL [address] Note 2018     ICD-10: Treatment Diagnosis: Pain in right shoulder (M25.511), Stiffness of right shoulder, not elsewhere classified (M25.611)  Precautions/Allergies:   Patient has no known allergies. Fall Risk Score: 0 (? 5 = High Risk)  MD Orders: Evaluate and treat, strengthening, range of motion, home exercise program, \"\"full motion, full strength, no restrictions\"  MEDICAL/REFERRING DIAGNOSIS:  S/P RT Shoulder Arthroscopy, Arthroscopic distal clavicle resection   DATE OF ONSET: 18 (surgery), 3/6/18 (injury)  REFERRING PHYSICIAN: Jeremy Angeles MD  RETURN PHYSICIAN APPOINTMENT: 2019     INITIAL ASSESSMENT:  Ms. Orlando Lob 6 days s/p R shoulder arthroscopy with distal clavicle resection and debridement of glenohumeral joint. Post-op pain, swelling, wound healing, weakness and decreased ROM are limiting normalized use and function of R UE. She will benefit from PT for guided shoulder rehab per MD order guidelines to promote safe return to normalized use of the UE with daily functional activities. PROBLEM LIST (Impacting functional limitations):  1. Decreased [place] with Home Exercise Program  2. Post-op R shoulder pain and swelling  3. Decreased R shoulder ROM   4. Weakness R shoulder INTERVENTIONS PLANNED:  1. Thermal and electric modalities, manual therapies for pain   2. Manual therapies, therapeutic exercises, HEP for ROM    3. Therapeutic exercises and HEP for strength   TREATMENT PLAN:  Effective Dates: 2018 TO 2018.  Frequency/Duration: 2-3 visits per week for 8 weeks (in anticipation of additional visits ordered by physician at follow up appointments)  GOALS: (Goals have been discussed and agreed upon with patient.)  Short-Term Functional Goals: Time Frame: 4 weeks  1. Report no more than 2-3/10 intermittent pain to R shoulder with compensatory use during basic functional activities, and score less than 60% on the DASH. 2. R shoulder PROM forward elevation greater than 160 degrees and external rotation greater than 90 degrees to progress into functional ranges. 3. Demonstrate good R shoulder isometric strength with manual testing to progress into strength phase. 4. Independent with initial HEP. Discharge Goals: Time Frame: 8 weeks  1. No more than 2-3/10 intermittent pain R shoulder with return to normalized household and work activities, and score less than 20% on the DASH. 2. R shoulder AROM forward elevation greater than 160 degrees, active external rotation greater than T1-2 with functional reaching, and strength to shoulder are grossly WNL's for safe use with normalized activities. 3. Demonstrate good functional shoulder strength and endurance for return to normalized household and work activities. 4. Independent with advanced shoulder HEP for continued self-management. Rehabilitation Potential For Stated Goals: GOOD                The information in this section was collected on 12-13-18 (except where otherwise noted). HISTORY:   History of Present Injury/Illness (Reason for Referral): Pt reports that she injured her R shoulder while at work on March 6, 2018 when she lifted a box and felt sudden pain in her R shoulder. Attended 12 visits of physical therapy with no relief, and underwent surgery on 12/6/18. Past Medical History/Comorbidities: Ms. Arnold Gold  has a past medical history of Asthma, BMI 50.0-59.9, adult ([address] and Former smoker. Ms. Arnold Gold  has a past surgical history that includes hx dilation and curettage (2017) and ARTHROSCOPY RIGHT SHOULDER ARTHROSCOPIC DISTAL CLAVICLE RESECTION, EXTENSIVE DEBRIDEMENT GLENOHUMERAL JOINT, SUBACROMIAL SPACE     (Right, 12/7/2018).   Social History/Living Environment:  Patient lives alone in a 1-story house usually, but is staying with her mother and siblings in a two-story house while recovering from this surgery. Prior Level of Function/Work/Activity: Patient previously worked full-time for [employer] at [employer]. 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 Dominant Side:         RIGHT  Current Medications:     -Promethazine 25 mg - nausea  - Ketorolac 10 MG - pain  - Hydromorphone 2 MG - pain  - Temazepam 15 MG - sleeping   Date Last Reviewed:  12/13/18   Number of Personal Factors/Comorbidities that affect the Plan of Care: 1-2: MODERATE COMPLEXITY   EXAMINATION:   12-13-18  Observation/Orthostatic Postural Assessment:  Surgical wound to R shoulder appears to be healing well. Sutures are intact, wound edges approximated, and there is no drainage or redness noted. Incisions to R shoulder covered by adhesive bandages but no drainage noticed through the bandage. Arrived to PT without sling on R UE, with R UE in in adducted, internally rotated guarded position with elbow flexion at rest.     Palpation:  Tenderness to R anterior deltoid. ROM:    L shoulder AROM (standing)   Flexion: 162 degrees   Abduction: 170 degrees   IR with reaching: T5   ER with reaching: T2              R shoulder AROM (standing)   Flexion: 20 degrees      R shoulder PROM (supine)   Flexion: 120 degrees   ABD: 90 degrees   ER (at 45 deg ABD): 60 degrees   IR to beltline         Strength:    L shoulder   Flex 5/5   ABD 5/5   IR (standing, elbow at side) 5/5   ER (standing, elbow at side) 5/5     R shoulder   Isometrics in supine, 4/5 for flexion   Grossly 4/5 for ER/IR with elbow at side         Special Tests: None  Neurological Screen: Motor and sensory to L UE intact. Functional Mobility:  Sit to/from Stand: independent. Bed Mobility: independent. Independent with basic mobility.   Requires mod-max A with dressing and grooming ADL's per patient report. Balance:  Not tested        Body Structures Involved:  1. Nerves  2. Bones  3. Joints  4. Muscles Body Functions Affected:  1. Sensory/Pain  2. Neuromusculoskeletal  3. Movement Related  4. Skin Related Activities and Participation Affected:  1. Self Care  2. Interpersonal Interactions and Relationships  3. Community, Social and [place]   Number of elements (examined above) that affect the Plan of Care: 4+: HIGH COMPLEXITY   CLINICAL PRESENTATION:   Presentation: Stable and uncomplicated: LOW COMPLEXITY   CLINICAL DECISION MAKING:   Outcome Measure: Tool Used: Disabilities of the Arm, Shoulder and Hand (DASH) Questionnaire - Quick Version  Score:  Initial: 53/55 or 95% limited (12-13-18) Most Recent: X/55 (Date: -- )   Interpretation of Score: The DASH is designed to measure the activities of daily living in person's with upper extremity dysfunction or pain. Each section is scored on a 1-5 scale, 5 representing the greatest disability. The scores of each section are added together for a total score of 55. This number is divided by 11, followed by subtracting 1 and multiplying by 25 to get a percent score of disability. This value represents the percentage disability: 0-20% minimal disability; 20-40% moderate disability; 40-60% severe disability; % dependent for care or exaggerated symptom behavior. Minimal detectable change is 12%. Score 11 12-19 20-28 29-37 38-45 46-54 55   Modifier CH CI CJ CK CL CM CN     Medical Necessity:   · Skilled intervention continues to be required due to R shoulder pain s/p R shoulder arthroscopy. Reason for Services/Other Comments:  · Patient continues to require skilled intervention due to R shoulder pain s/p R shoulder arthroscopy.    Use of outcome tool(s) and clinical judgement create a POC that gives a: Clear prediction of patient's progress: LOW COMPLEXITY            TREATMENT:   (In addition to Assessment/Re-Assessment sessions the following treatments were rendered)  Pre-treatment Symptoms/Complaints: Reports some pain in front of R shoulder today, which concerns her as that is where she was feeling pain prior to the surgery. Pain: Initial:   7/10 Post Session:  7/10 - given ice at end of PT     Manual Therapy (12 minutes) to improve R shoulder ROM. Grade 3-4 physio mobilizations in supine for flexion, abduction, IR and ER performed with R UE fully supported in scapular plane. Therapeutic Exercise ( 29 minutes) to improve R upper extremity function. Isometrics performed for flexion, abduction, IR and ER performed in supine in scapular plane. .   Date:  12-20-18 Date:  12-21-18 Date:     Activity/Exercise Parameters Parameters Parameters   Flexion Supine x 10 through full ROM    Semi-reclined  2 x 10 active Supine 2 x 10 through full ROM    Standing to 90 degrees, 3 x 8    Supine press 0# 3 x 10 0# 3 x 10     External rotations 0# x 10  2# 2 x 12   0# x 10  1# x 10  2# x 10    Green, 3 x 10 single band    Abduction Side-lying 3 x 10, 0# Side-lying  1# 3 x 10     Prone rows 3 x 10 0# 2# 3 x 10    Scapular retractions Blue, elbows flex 2 x 15 B    Black, elbows ext  2 x 10 B    Prone extension, 3 x 10  Black, elbows flexed 3 x 10 B    Black, elbows ext 2 x 10 B    Prone extension 3 x 10    AAROM UE [place] 2 x 10    Wall slides, forearm neutral 2 x 10 Wall slides, forearm neutral 1 x 12            HEP: Added standing shoulder flexion, side-lying abductions and band-resisted scapular retractions to improve R shoulder function. Pt verbalized understanding. Treatment/Session Assessment:    · Response to Treatment:  Pt continues to improve with range of motion, and was able to reach approximately 110 degrees of flexion actively in standing. Patient making progress, but limited by discomfort anteriorly today. · Compliance with Program/Exercises: Compliant  · Recommendations/Intent for next treatment session:  \"Next visit will focus on improving strength and function of R UE\".    Total Treatment Duration: 40 Minutes  PT Patient Time In/Time Out  Time In: 1000  Time Out: [address], PT

## 2018-12-27 ENCOUNTER — HOSPITAL ENCOUNTER (OUTPATIENT)
Dept: PHYSICAL THERAPY | Age: 28
Discharge: HOME | End: 2018-12-27
Payer: COMMERCIAL

## 2018-12-27 PROCEDURE — 97140 MANUAL THERAPY 1/> REGIONS: CPT

## 2018-12-27 PROCEDURE — 97110 THERAPEUTIC EXERCISES: CPT

## 2018-12-27 NOTE — PROGRESS NOTES
[employer]  : 1990  Payor: [address] [place] [address] / Plan: Jacinta Coughlin / Product Type: Workers Comp /  [id number]  at [facility]  [address] [place] [address], [address].  [phone]   [phone]       OUTPATIENT PHYSICAL [address] Note 2018     ICD-10: Treatment Diagnosis: Pain in right shoulder (M25.511), Stiffness of right shoulder, not elsewhere classified (M25.611)  Precautions/Allergies:   Patient has no known allergies. Fall Risk Score: 0 (? 5 = High Risk)  MD Orders: Evaluate and treat, strengthening, range of motion, home exercise program, \"\"full motion, full strength, no restrictions\"  MEDICAL/REFERRING DIAGNOSIS:  S/P RT Shoulder Arthroscopy, Arthroscopic distal clavicle resection   DATE OF ONSET: 18 (surgery), 3/6/18 (injury)  REFERRING PHYSICIAN: Irvin Ulloa MD  RETURN PHYSICIAN APPOINTMENT: 2019     INITIAL ASSESSMENT:  Ms. Kat Momin 6 days s/p R shoulder arthroscopy with distal clavicle resection and debridement of glenohumeral joint. Post-op pain, swelling, wound healing, weakness and decreased ROM are limiting normalized use and function of R UE. She will benefit from PT for guided shoulder rehab per MD order guidelines to promote safe return to normalized use of the UE with daily functional activities. PROBLEM LIST (Impacting functional limitations):  1. Decreased [place] with Home Exercise Program  2. Post-op R shoulder pain and swelling  3. Decreased R shoulder ROM   4. Weakness R shoulder INTERVENTIONS PLANNED:  1. Thermal and electric modalities, manual therapies for pain   2. Manual therapies, therapeutic exercises, HEP for ROM    3. Therapeutic exercises and HEP for strength   TREATMENT PLAN:  Effective Dates: 2018 TO 2018.  Frequency/Duration: 2-3 visits per week for 8 weeks (in anticipation of additional visits ordered by physician at follow up appointments)  GOALS: (Goals have been discussed and agreed upon with patient.)  Short-Term Functional Goals: Time Frame: 4 weeks  1. Report no more than 2-3/10 intermittent pain to R shoulder with compensatory use during basic functional activities, and score less than 60% on the DASH. 2. R shoulder PROM forward elevation greater than 160 degrees and external rotation greater than 90 degrees to progress into functional ranges. 3. Demonstrate good R shoulder isometric strength with manual testing to progress into strength phase. 4. Independent with initial HEP. Discharge Goals: Time Frame: 8 weeks  1. No more than 2-3/10 intermittent pain R shoulder with return to normalized household and work activities, and score less than 20% on the DASH. 2. R shoulder AROM forward elevation greater than 160 degrees, active external rotation greater than T1-2 with functional reaching, and strength to shoulder are grossly WNL's for safe use with normalized activities. 3. Demonstrate good functional shoulder strength and endurance for return to normalized household and work activities. 4. Independent with advanced shoulder HEP for continued self-management. Rehabilitation Potential For Stated Goals: GOOD                The information in this section was collected on 12-13-18 (except where otherwise noted). HISTORY:   History of Present Injury/Illness (Reason for Referral): Pt reports that she injured her R shoulder while at work on March 6, 2018 when she lifted a box and felt sudden pain in her R shoulder. Attended 12 visits of physical therapy with no relief, and underwent surgery on 12/6/18. Past Medical History/Comorbidities: Ms. Hubert Bravo  has a past medical history of Asthma, BMI 50.0-59.9, adult ([address] and Former smoker. Ms. Hubert Bravo  has a past surgical history that includes hx dilation and curettage (2017) and ARTHROSCOPY RIGHT SHOULDER ARTHROSCOPIC DISTAL CLAVICLE RESECTION, EXTENSIVE DEBRIDEMENT GLENOHUMERAL JOINT, SUBACROMIAL SPACE     (Right, 12/7/2018).   Social History/Living Environment:  Patient lives alone in a 1-story house usually, but is staying with her mother and siblings in a two-story house while recovering from this surgery. Prior Level of Function/Work/Activity: Patient previously worked full-time for [employer] at [employer]. Patient not currently working but will be resuming classes at [employer] [facility] [employer] in January. Dominant Side:         RIGHT  Current Medications:     -Promethazine 25 mg - nausea  - Ketorolac 10 MG - pain  - Hydromorphone 2 MG - pain  - Temazepam 15 MG - sleeping   Date Last Reviewed:  12/13/18   Number of Personal Factors/Comorbidities that affect the Plan of Care: 1-2: MODERATE COMPLEXITY   EXAMINATION:   12-13-18  Observation/Orthostatic Postural Assessment:  Surgical wound to R shoulder appears to be healing well. Sutures are intact, wound edges approximated, and there is no drainage or redness noted. Incisions to R shoulder covered by adhesive bandages but no drainage noticed through the bandage. Arrived to PT without sling on R UE, with R UE in in adducted, internally rotated guarded position with elbow flexion at rest.     Palpation:  Tenderness to R anterior deltoid. ROM:    L shoulder AROM (standing)   Flexion: 162 degrees   Abduction: 170 degrees   IR with reaching: T5   ER with reaching: T2              R shoulder AROM (standing)   Flexion: 20 degrees      R shoulder PROM (supine)   Flexion: 120 degrees   ABD: 90 degrees   ER (at 45 deg ABD): 60 degrees   IR to beltline         Strength:    L shoulder   Flex 5/5   ABD 5/5   IR (standing, elbow at side) 5/5   ER (standing, elbow at side) 5/5     R shoulder   Isometrics in supine, 4/5 for flexion   Grossly 4/5 for ER/IR with elbow at side         Special Tests: None  Neurological Screen: Motor and sensory to L UE intact. Functional Mobility:  Sit to/from Stand: independent. Bed Mobility: independent. Independent with basic mobility.   Requires mod-max A with dressing and grooming ADL's per patient report. Balance:  Not tested        Body Structures Involved:  1. Nerves  2. Bones  3. Joints  4. Muscles Body Functions Affected:  1. Sensory/Pain  2. Neuromusculoskeletal  3. Movement Related  4. Skin Related Activities and Participation Affected:  1. Self Care  2. Interpersonal Interactions and Relationships  3. Community, Social and [place]   Number of elements (examined above) that affect the Plan of Care: 4+: HIGH COMPLEXITY   CLINICAL PRESENTATION:   Presentation: Stable and uncomplicated: LOW COMPLEXITY   CLINICAL DECISION MAKING:   Outcome Measure: Tool Used: Disabilities of the Arm, Shoulder and Hand (DASH) Questionnaire - Quick Version  Score:  Initial: 53/55 or 95% limited (12-13-18) Most Recent: X/55 (Date: -- )   Interpretation of Score: The DASH is designed to measure the activities of daily living in person's with upper extremity dysfunction or pain. Each section is scored on a 1-5 scale, 5 representing the greatest disability. The scores of each section are added together for a total score of 55. This number is divided by 11, followed by subtracting 1 and multiplying by 25 to get a percent score of disability. This value represents the percentage disability: 0-20% minimal disability; 20-40% moderate disability; 40-60% severe disability; % dependent for care or exaggerated symptom behavior. Minimal detectable change is 12%. Score 11 12-19 20-28 29-37 38-45 46-54 55   Modifier CH CI CJ CK CL CM CN     Medical Necessity:   · Skilled intervention continues to be required due to R shoulder pain s/p R shoulder arthroscopy. Reason for Services/Other Comments:  · Patient continues to require skilled intervention due to R shoulder pain s/p R shoulder arthroscopy.    Use of outcome tool(s) and clinical judgement create a POC that gives a: Clear prediction of patient's progress: LOW COMPLEXITY            TREATMENT:   (In addition to Assessment/Re-Assessment sessions the following treatments were rendered)  Pre-treatment Symptoms/Complaints: R shoulder is hurting today. Pain: Initial:   No VAS Post Session:  No VAS     Manual Therapy (16 minutes) to improve R shoulder ROM. Grade 3-4 physio mobilizations in supine for flexion, abduction, IR and ER performed with R UE fully supported in scapular plane. Therapeutic Exercise ( 29 minutes) to improve R upper extremity function. Isometrics performed for flexion, abduction, IR and ER performed in supine in scapular plane. .   Date:  12-20-18 Date:  12-21-18 Date:  12-27-18   Activity/Exercise Parameters Parameters Parameters   Flexion Supine x 10 through full ROM    Semi-reclined  2 x 10 active Supine 2 x 10 through full ROM    Standing to 90 degrees, 3 x 8 Standing to 90, 3 x 5   Supine press 0# 3 x 10 0# 3 x 10  0# x 10  1# 2 x 8   External rotations 0# x 10  2# 2 x 12   0# x 10  1# x 10  2# x 10    Green, 3 x 10 single band 3# 3 x 8   Abduction Side-lying 3 x 10, 0# Side-lying  1# 3 x 10  Side-lying  2# 3 x 10    Prone rows 3 x 10 0# 2# 3 x 10 4# 3 x 8   Scapular retractions Blue, elbows flex 2 x 15 B    Black, elbows ext  2 x 10 B    Prone extension, 3 x 10  Black, elbows flexed 3 x 10 B    Black, elbows ext 2 x 10 B    Prone extension 3 x 10 Black, elbows flex 2 x 12 B    Black, elbows ext  2 x 12 B   AAROM UE [place] 2 x 10    Wall slides, forearm neutral 2 x 10 Wall slides, forearm neutral 1 x 12     Mid-trap    Prone  0# x 10  2# 2 x 10    Lower trap   Prone  0# 2 x 10                      HEP: no changes to HEP    Treatment/Session Assessment:    · Response to Treatment: Audible, painful popping to R shoulder with flexion. Continues to be limited by some discomfort in anterior shoulder when performing therapeutic exercises. ROM improving. · Compliance with Program/Exercises: Compliant  · Recommendations/Intent for next treatment session: \"Next visit will focus on improving strength and function of R UE\".    Total Treatment Duration: 45 Minutes  PT Patient Time In/Time Out  Time In: 1600  Time Out: [address] [facility] [address], PT

## 2019-01-02 ENCOUNTER — APPOINTMENT (OUTPATIENT)
Dept: PHYSICAL THERAPY | Age: 29
End: 2019-01-02
Payer: COMMERCIAL

## 2019-01-04 ENCOUNTER — HOSPITAL ENCOUNTER (OUTPATIENT)
Dept: PHYSICAL THERAPY | Age: 29
Discharge: HOME | End: 2019-01-04
Payer: COMMERCIAL

## 2019-01-04 PROCEDURE — 97140 MANUAL THERAPY 1/> REGIONS: CPT

## 2019-01-04 PROCEDURE — 97110 THERAPEUTIC EXERCISES: CPT

## 2019-01-04 NOTE — PROGRESS NOTES
[employer]  : 1990  Payor: [address] [place] [address] / Plan: [employer] / Product Type: Workers Comp /  [address]  at [address] [place] [address] Rd  [address] [facility], [facility] [address]  [phone]   [phone]       OUTPATIENT PHYSICAL [address] Note 2019     ICD-10: Treatment Diagnosis: Pain in right shoulder (M25.511), Stiffness of right shoulder, not elsewhere classified (M25.611)  Precautions/Allergies:   Patient has no known allergies. Fall Risk Score: 0 (? 5 = High Risk)  MD Orders: Evaluate and treat, strengthening, range of motion, home exercise program, \"\"full motion, full strength, no restrictions\"  MEDICAL/REFERRING DIAGNOSIS:  S/P RT Shoulder Arthroscopy, Arthroscopic distal clavicle resection   DATE OF ONSET: 18 (surgery), 3/6/18 (injury)  REFERRING PHYSICIAN: Heidi Moscoso MD  RETURN PHYSICIAN APPOINTMENT: 2019     INITIAL ASSESSMENT:  Ms. Justina Russell 6 days s/p R shoulder arthroscopy with distal clavicle resection and debridement of glenohumeral joint. Post-op pain, swelling, wound healing, weakness and decreased ROM are limiting normalized use and function of R UE. She will benefit from PT for guided shoulder rehab per MD order guidelines to promote safe return to normalized use of the UE with daily functional activities. PROBLEM LIST (Impacting functional limitations):  1. Decreased [place] with Home Exercise Program  2. Post-op R shoulder pain and swelling  3. Decreased R shoulder ROM   4. Weakness R shoulder INTERVENTIONS PLANNED:  1. Thermal and electric modalities, manual therapies for pain   2. Manual therapies, therapeutic exercises, HEP for ROM    3. Therapeutic exercises and HEP for strength   TREATMENT PLAN:  Effective Dates: 2018 TO 2018.  Frequency/Duration: 2-3 visits per week for 8 weeks (in anticipation of additional visits ordered by physician at follow up appointments)  GOALS: (Goals have been discussed and agreed upon with patient.)  Short-Term Functional Goals: Time Frame: 4 weeks  1. Report no more than 2-3/10 intermittent pain to R shoulder with compensatory use during basic functional activities, and score less than 60% on the DASH. 2. R shoulder PROM forward elevation greater than 160 degrees and external rotation greater than 90 degrees to progress into functional ranges. 3. Demonstrate good R shoulder isometric strength with manual testing to progress into strength phase. 4. Independent with initial HEP. Discharge Goals: Time Frame: 8 weeks  1. No more than 2-3/10 intermittent pain R shoulder with return to normalized household and work activities, and score less than 20% on the DASH. 2. R shoulder AROM forward elevation greater than 160 degrees, active external rotation greater than T1-2 with functional reaching, and strength to shoulder are grossly WNL's for safe use with normalized activities. 3. Demonstrate good functional shoulder strength and endurance for return to normalized household and work activities. 4. Independent with advanced shoulder HEP for continued self-management. Rehabilitation Potential For Stated Goals: GOOD                The information in this section was collected on 12-13-18 (except where otherwise noted). HISTORY:   History of Present Injury/Illness (Reason for Referral): Pt reports that she injured her R shoulder while at work on March 6, 2018 when she lifted a box and felt sudden pain in her R shoulder. Attended 12 visits of physical therapy with no relief, and underwent surgery on 12/6/18. Past Medical History/Comorbidities: Ms. Ricki Maharaj  has a past medical history of Asthma, BMI 50.0-59.9, adult ([address] and Former smoker. Ms. Ricki Maharaj  has a past surgical history that includes hx dilation and curettage (2017) and ARTHROSCOPY RIGHT SHOULDER ARTHROSCOPIC DISTAL CLAVICLE RESECTION, EXTENSIVE DEBRIDEMENT GLENOHUMERAL JOINT, SUBACROMIAL SPACE     (Right, 12/7/2018).   Social History/Living Environment:  Patient lives alone in a 1-story house usually, but is staying with her mother and siblings in a two-story house while recovering from this surgery. Prior Level of Function/Work/Activity: Patient previously worked full-time for [employer] at [employer]. Patient not currently working but will be resuming classes at [employer] [facility] [place] [employer] in January. Dominant Side:         RIGHT  Current Medications:     -Promethazine 25 mg - nausea  - Ketorolac 10 MG - pain  - Hydromorphone 2 MG - pain  - Temazepam 15 MG - sleeping   Date Last Reviewed:  12/13/18   Number of Personal Factors/Comorbidities that affect the Plan of Care: 1-2: MODERATE COMPLEXITY   EXAMINATION:   12-13-18  Observation/Orthostatic Postural Assessment:  Surgical wound to R shoulder appears to be healing well. Sutures are intact, wound edges approximated, and there is no drainage or redness noted. Incisions to R shoulder covered by adhesive bandages but no drainage noticed through the bandage. Arrived to PT without sling on R UE, with R UE in in adducted, internally rotated guarded position with elbow flexion at rest.     Palpation:  Tenderness to R anterior deltoid. ROM:    L shoulder AROM (standing)   Flexion: 162 degrees   Abduction: 170 degrees   IR with reaching: T5   ER with reaching: T2              R shoulder AROM (standing)   Flexion: 20 degrees      R shoulder PROM (supine)   Flexion: 120 degrees   ABD: 90 degrees   ER (at 45 deg ABD): 60 degrees   IR to beltline         Strength:    L shoulder   Flex 5/5   ABD 5/5   IR (standing, elbow at side) 5/5   ER (standing, elbow at side) 5/5     R shoulder   Isometrics in supine, 4/5 for flexion   Grossly 4/5 for ER/IR with elbow at side         Special Tests: None  Neurological Screen: Motor and sensory to L UE intact. Functional Mobility:  Sit to/from Stand: independent. Bed Mobility: independent. Independent with basic mobility.   Requires mod-max A with dressing and grooming ADL's per patient report. Balance:  Not tested        Body Structures Involved:  1. Nerves  2. Bones  3. Joints  4. Muscles Body Functions Affected:  1. Sensory/Pain  2. Neuromusculoskeletal  3. Movement Related  4. Skin Related Activities and Participation Affected:  1. Self Care  2. Interpersonal Interactions and Relationships  3. Community, Social and [place]   Number of elements (examined above) that affect the Plan of Care: 4+: HIGH COMPLEXITY   CLINICAL PRESENTATION:   Presentation: Stable and uncomplicated: LOW COMPLEXITY   CLINICAL DECISION MAKING:   Outcome Measure: Tool Used: Disabilities of the Arm, Shoulder and Hand (DASH) Questionnaire - Quick Version  Score:  Initial: 53/55 or 95% limited (12-13-18) Most Recent: X/55 (Date: -- )   Interpretation of Score: The DASH is designed to measure the activities of daily living in person's with upper extremity dysfunction or pain. Each section is scored on a 1-5 scale, 5 representing the greatest disability. The scores of each section are added together for a total score of 55. This number is divided by 11, followed by subtracting 1 and multiplying by 25 to get a percent score of disability. This value represents the percentage disability: 0-20% minimal disability; 20-40% moderate disability; 40-60% severe disability; % dependent for care or exaggerated symptom behavior. Minimal detectable change is 12%. Score 11 12-19 20-28 29-37 38-45 46-54 55   Modifier CH CI CJ CK CL CM CN     Medical Necessity:   · Skilled intervention continues to be required due to R shoulder pain s/p R shoulder arthroscopy. Reason for Services/Other Comments:  · Patient continues to require skilled intervention due to R shoulder pain s/p R shoulder arthroscopy.    Use of outcome tool(s) and clinical judgement create a POC that gives a: Clear prediction of patient's progress: LOW COMPLEXITY            TREATMENT:   (In addition to Assessment/Re-Assessment sessions the following treatments were rendered)  Pre-treatment Symptoms/Complaints: \"My shoulder sucks\"  Pain: Initial:   No VAS Post Session:  No VAS     Manual Therapy (15 minutes) to improve R shoulder ROM. Grade 3-4 physio mobilizations in supine for flexion, abduction, IR and ER performed with R UE fully supported in scapular plane. Therapeutic Exercise ( 25 minutes) to improve R upper extremity function. Isometrics performed for flexion, abduction, IR and ER performed in supine in scapular plane. .   Date:  12-20-18 Date:  12-21-18 Date:  12-27-18 Date  1-4-18   Activity/Exercise Parameters Parameters Parameters    Flexion Supine x 10 through full ROM    Semi-reclined  2 x 10 active Supine 2 x 10 through full ROM    Standing to 90 degrees, 3 x 8 Standing to 90, 3 x 5 Standing, to 90 3 x 6   Supine press 0# 3 x 10 0# 3 x 10  0# x 10  1# 2 x 8 1# 3 x 10   External rotations 0# x 10  2# 2 x 12   0# x 10  1# x 10  2# x 10    Green, 3 x 10 single band 3# 3 x 8 3# 3 x 10   Abduction Side-lying 3 x 10, 0# Side-lying  1# 3 x 10  Side-lying  2# 3 x 10  Side-lying  2# 3 x 10   Prone rows 3 x 10 0# 2# 3 x 10 4# 3 x 8    Scapular retractions Blue, elbows flex 2 x 15 B    Black, elbows ext  2 x 10 B    Prone extension, 3 x 10  Black, elbows flexed 3 x 10 B    Black, elbows ext 2 x 10 B    Prone extension 3 x 10 Black, elbows flex 2 x 12 B    Black, elbows ext  2 x 12 B Blue, elbows ext  3 x 12 B       AAROM UE [place] 2 x 10    Wall slides, forearm neutral 2 x 10 Wall slides, forearm neutral 1 x 12   UE [place] 3 x 10   Mid-trap    Prone  0# x 10  2# 2 x 10  Prone  2# 3 x 10   Lower trap   Prone  0# 2 x 10 Prone  0# 3 x 10                        HEP: none added today. Treatment/Session Assessment:    · Response to Treatment:  Audible popping continues with flexion to 90 degrees. Range of motion improving significantly, but continues to have significant pain.   · Compliance with Program/Exercises: Compliant  · Recommendations/Intent for next treatment session: \"Next visit will focus on improving strength and function of R UE\".    Total Treatment Duration: 40 Minutes  PT Patient Time In/Time Out  Time In: 1030  Time Out: [address], PT

## 2019-01-08 ENCOUNTER — HOSPITAL ENCOUNTER (OUTPATIENT)
Dept: PHYSICAL THERAPY | Age: 29
Discharge: HOME | End: 2019-01-08
Payer: COMMERCIAL

## 2019-01-08 PROCEDURE — 97110 THERAPEUTIC EXERCISES: CPT

## 2019-01-08 PROCEDURE — 97140 MANUAL THERAPY 1/> REGIONS: CPT

## 2019-01-08 NOTE — PROGRESS NOTES
[employer]  : 1990  Payor: [address] [place] [address] Road / Plan: Mike Boast / Product Type: Workers Comp /  [address]  at [facility] CAIO MISTRY  [address] [place] [address], 4 Tila Thacker.  [phone]   [phone]       OUTPATIENT PHYSICAL THERAPY:Daily Note and Progress Report 2019     ICD-10: Treatment Diagnosis: Pain in right shoulder (M25.511), Stiffness of right shoulder, not elsewhere classified (M25.611)  Precautions/Allergies:   Patient has no known allergies. Fall Risk Score: 0 (? 5 = High Risk)  MD Orders: Evaluate and treat, strengthening, range of motion, home exercise program, \"\"full motion, full strength, no restrictions\"  MEDICAL/REFERRING DIAGNOSIS:  S/P RT Shoulder Arthroscopy, Arthroscopic distal clavicle resection   DATE OF ONSET: 18 (surgery), 3/6/18 (injury)  REFERRING PHYSICIAN: Rebekah Melvin MD  RETURN PHYSICIAN APPOINTMENT: 19 ASSESSMENT:  Ms. Godfrey Ranks 1 month s/p R shoulder arthroscopy with distal clavicle resection and debridement of glenohumeral joint. Patient exhibits improved R shoulder AROM in all directions and improving strength, but remains limited by discomfort. Pt is a good candidate for continued therapy to improve these deficits and facilitate improved strength and AROM in order to restore full function to R UE. PROBLEM LIST (Impacting functional limitations):  1. Decreased [place] with Home Exercise Program  2. Post-op R shoulder pain and swelling  3. Decreased R shoulder ROM   4. Weakness R shoulder INTERVENTIONS PLANNED:  1. Thermal and electric modalities, manual therapies for pain   2. Manual therapies, therapeutic exercises, HEP for ROM    3. Therapeutic exercises and HEP for strength   TREATMENT PLAN:  Effective Dates: 2018 TO 2018.  Frequency/Duration: 2-3 visits per week for 8 weeks (in anticipation of additional visits ordered by physician at follow up appointments)  GOALS: (Goals have been discussed and agreed upon with patient.)  Short-Term Functional Goals: Time Frame: 4 weeks  1. Report no more than 2-3/10 intermittent pain to R shoulder with compensatory use during basic functional activities, and score less than 60% on the DASH. Ongoing, progressing 1-8-19  2. R shoulder PROM forward elevation greater than 160 degrees and external rotation greater than 90 degrees to progress into functional ranges. Ongoing, progressing 1-8-19  3. Demonstrate good R shoulder isometric strength with manual testing to progress into strength phase. MET 1-8-19  4. Independent with initial HEP. Discharge Goals: Time Frame: 8 weeks  1. No more than 2-3/10 intermittent pain R shoulder with return to normalized household and work activities, and score less than 20% on the DASH. 2. R shoulder AROM forward elevation greater than 160 degrees, active external rotation greater than T1-2 with functional reaching, and strength to shoulder are grossly WNL's for safe use with normalized activities. 3. Demonstrate good functional shoulder strength and endurance for return to normalized household and work activities. 4. Independent with advanced shoulder HEP for continued self-management. Rehabilitation Potential For Stated Goals: GOOD                The information in this section was collected on 12-13-18 (except where otherwise noted). HISTORY:   History of Present Injury/Illness (Reason for Referral): Pt reports that she injured her R shoulder while at work on March 6, 2018 when she lifted a box and felt sudden pain in her R shoulder. Attended 12 visits of physical therapy with no relief, and underwent surgery on 12/6/18. Past Medical History/Comorbidities: Ms. Radha Patel  has a past medical history of Asthma, BMI 50.0-59.9, adult ([address] and Former smoker.   Ms. Radha Patel  has a past surgical history that includes hx dilation and curettage (2017) and ARTHROSCOPY RIGHT SHOULDER ARTHROSCOPIC DISTAL CLAVICLE RESECTION, EXTENSIVE DEBRIDEMENT GLENOHUMERAL JOINT, SUBACROMIAL SPACE     (Right, 12/7/2018). Social History/Living Environment:  Patient lives alone in a 1-story house usually, but is staying with her mother and siblings in a two-story house while recovering from this surgery. Prior Level of Function/Work/Activity: Patient previously worked full-time for [employer] at [employer]. 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 Dominant Side:         RIGHT  Current Medications:     -Promethazine 25 mg - nausea  - Ketorolac 10 MG - pain  - Hydromorphone 2 MG - pain  - Temazepam 15 MG - sleeping   Date Last Reviewed:  12/13/18   Number of Personal Factors/Comorbidities that affect the Plan of Care: 1-2: MODERATE COMPLEXITY   EXAMINATION:   1-8-19  Observation/Orthostatic Postural Assessment:  Pt arrived to PT in no obvious distress. Palpation:  Tenderness to R anterior deltoid. ROM:                  R shoulder AROM (standing) (1-8-19)   Flexion: 125 degrees   ER (elbow at side) 60 degr   ABD: 93 deg              Strength:         R shoulder  (1-8-19)   flexion 4/5   ABD 4-/5   ER (elbow at side): 4/5   IR (elbow at side): 5/5         Special Tests: None  Neurological Screen: Motor and sensory to L UE intact. Functional Mobility:  Sit to/from Stand: independent. Bed Mobility: independent. Independent with basic mobility. Balance:  Not tested        Body Structures Involved:  1. Nerves  2. Bones  3. Joints  4. Muscles Body Functions Affected:  1. Sensory/Pain  2. Neuromusculoskeletal  3. Movement Related  4. Skin Related Activities and Participation Affected:  1. Self Care  2. Interpersonal Interactions and Relationships  3. Community, Social and [place]   Number of elements (examined above) that affect the Plan of Care: 4+: HIGH COMPLEXITY   CLINICAL PRESENTATION:   Presentation: Stable and uncomplicated: LOW COMPLEXITY   CLINICAL DECISION MAKING:   Outcome Measure:    Tool Used: Disabilities of the Arm, Shoulder and Hand (DASH) Questionnaire - Quick Version  Score:  Initial: 53/55 or 95% limited (12-13-18) Most Recent: 45/55 or 77% limited (Date: 1-8-19 )   Interpretation of Score: The DASH is designed to measure the activities of daily living in person's with upper extremity dysfunction or pain. Each section is scored on a 1-5 scale, 5 representing the greatest disability. The scores of each section are added together for a total score of 55. This number is divided by 11, followed by subtracting 1 and multiplying by 25 to get a percent score of disability. This value represents the percentage disability: 0-20% minimal disability; 20-40% moderate disability; 40-60% severe disability; % dependent for care or exaggerated symptom behavior. Minimal detectable change is 12%. Score 11 12-19 20-28 29-37 38-45 46-54 55   Modifier CH CI CJ CK CL CM CN     Medical Necessity:   · Skilled intervention continues to be required due to R shoulder pain s/p R shoulder arthroscopy. Reason for Services/Other Comments:  · Patient continues to require skilled intervention due to R shoulder pain s/p R shoulder arthroscopy. Use of outcome tool(s) and clinical judgement create a POC that gives a: Clear prediction of patient's progress: LOW COMPLEXITY            TREATMENT:   (In addition to Assessment/Re-Assessment sessions the following treatments were rendered)  Pre-treatment Symptoms/Complaints: Patient reports that her shoulder continues to be very painful, particularly in the front. She sees Dr. Herman Party later today. Pain: Initial:   No VAS Post Session:  No VAS     Manual Therapy (9 minutes) to improve R shoulder ROM. Grade 3-4 physio mobilizations in supine for flexion, abduction, IR and ER performed with R UE fully supported in scapular plane. Grade 1-2 glenohumeral accessory joint mobilizations to reduce R shoulder discomfort.     Therapeutic Exercise ( 30 minutes) to improve R upper extremity function. Isometrics performed for flexion at 90 degrees shoulder flexion. .   Date:  12-20-18 Date:  12-21-18 Date:  12-27-18 Date  1-4-18 Date  1-8-19   Activity/Exercise Parameters Parameters Parameters     Flexion Supine x 10 through full ROM    Semi-reclined  2 x 10 active Supine 2 x 10 through full ROM    Standing to 90 degrees, 3 x 8 Standing to 90, 3 x 5 Standing, to 90 3 x 6    Supine press 0# 3 x 10 0# 3 x 10  0# x 10  1# 2 x 8 1# 3 x 10 1# x 10  2# 3 x 10   External rotations 0# x 10  2# 2 x 12   0# x 10  1# x 10  2# x 10    Green, 3 x 10 single band 3# 3 x 8 3# 3 x 10 3# 3 x 10   Abduction Side-lying 3 x 10, 0# Side-lying  1# 3 x 10  Side-lying  2# 3 x 10  Side-lying  2# 3 x 10    Prone rows 3 x 10 0# 2# 3 x 10 4# 3 x 8  5# 3 x 10   Scapular retractions Blue, elbows flex 2 x 15 B    Black, elbows ext  2 x 10 B    Prone extension, 3 x 10  Black, elbows flexed 3 x 10 B    Black, elbows ext 2 x 10 B    Prone extension 3 x 10 Black, elbows flex 2 x 12 B    Black, elbows ext  2 x 12 B Blue, elbows ext  3 x 12 B     Cable, unilateral  R 7# 3 x 8   AAROM UE [place] 2 x 10    Wall slides, forearm neutral 2 x 10 Wall slides, forearm neutral 1 x 12   UE [place] 3 x 10 UE [place] 3 x 10    Mid-trap    Prone  0# x 10  2# 2 x 10  Prone  2# 3 x 10 Prone  2# 3 x 10   Lower trap   Prone  0# 2 x 10 Prone  0# 3 x 10 Prone  1# 1 x 10  0# 1 x 10                          HEP: none added today. Treatment/Session Assessment:    · Response to Treatment:  No popping noticed during performance of shoulder flexion today. Patient has made good progress with forward elevation AROM but needs continued therapy for continued improvement. Remains limited by discomfort. · Compliance with Program/Exercises: Compliant  · Recommendations/Intent for next treatment session: \"Next visit will focus on improving strength and function of R UE\".    Total Treatment Duration: 40 Minutes  PT Patient Time In/Time Out  Time In: 5625  Time Out: [address] [facility], PT

## 2019-01-14 ENCOUNTER — HOSPITAL ENCOUNTER (OUTPATIENT)
Dept: PHYSICAL THERAPY | Age: 29
Discharge: HOME | End: 2019-01-14
Payer: COMMERCIAL

## 2019-01-14 PROCEDURE — 97110 THERAPEUTIC EXERCISES: CPT

## 2019-01-14 PROCEDURE — 97140 MANUAL THERAPY 1/> REGIONS: CPT

## 2019-01-14 NOTE — PROGRESS NOTES
[employer]  : 1990  Payor: [address] [place] [address] Road / Plan: Kala Avelino / Product Type: Workers Comp /  [address]  at [facility]  [address] [place] [address], 4 Tila Thacker.  [phone]   [phone]       OUTPATIENT PHYSICAL [address] Note 2019     ICD-10: Treatment Diagnosis: Pain in right shoulder (M25.511), Stiffness of right shoulder, not elsewhere classified (M25.611)  Precautions/Allergies:   Patient has no known allergies. Fall Risk Score: 0 (? 5 = High Risk)  MD Orders: Evaluate and treat, strengthening, range of motion, home exercise program, \"\"full motion, full strength, no restrictions\"  MEDICAL/REFERRING DIAGNOSIS:  S/P RT Shoulder Arthroscopy, Arthroscopic distal clavicle resection   DATE OF ONSET: 18 (surgery), 3/6/18 (injury)  REFERRING PHYSICIAN: Gil Momin MD  RETURN PHYSICIAN APPOINTMENT: 19 ASSESSMENT:  Ms. Abraham Mon 1 month s/p R shoulder arthroscopy with distal clavicle resection and debridement of glenohumeral joint. Patient exhibits improved R shoulder AROM in all directions and improving strength, but remains limited by discomfort. Pt is a good candidate for continued therapy to improve these deficits and facilitate improved strength and AROM in order to restore full function to R UE. PROBLEM LIST (Impacting functional limitations):  1. Decreased [place] with Home Exercise Program  2. Post-op R shoulder pain and swelling  3. Decreased R shoulder ROM   4. Weakness R shoulder INTERVENTIONS PLANNED:  1. Thermal and electric modalities, manual therapies for pain   2. Manual therapies, therapeutic exercises, HEP for ROM    3. Therapeutic exercises and HEP for strength   TREATMENT PLAN:  Effective Dates: 2018 TO 2018.  Frequency/Duration: 2-3 visits per week for 8 weeks (in anticipation of additional visits ordered by physician at follow up appointments)  GOALS: (Goals have been discussed and agreed upon with patient.)  Short-Term Functional Goals: Time Frame: 4 weeks  1. Report no more than 2-3/10 intermittent pain to R shoulder with compensatory use during basic functional activities, and score less than 60% on the DASH. Ongoing, progressing 1-8-19  2. R shoulder PROM forward elevation greater than 160 degrees and external rotation greater than 90 degrees to progress into functional ranges. Ongoing, progressing 1-8-19  3. Demonstrate good R shoulder isometric strength with manual testing to progress into strength phase. MET 1-8-19  4. Independent with initial HEP. Discharge Goals: Time Frame: 8 weeks  1. No more than 2-3/10 intermittent pain R shoulder with return to normalized household and work activities, and score less than 20% on the DASH. 2. R shoulder AROM forward elevation greater than 160 degrees, active external rotation greater than T1-2 with functional reaching, and strength to shoulder are grossly WNL's for safe use with normalized activities. 3. Demonstrate good functional shoulder strength and endurance for return to normalized household and work activities. 4. Independent with advanced shoulder HEP for continued self-management. Rehabilitation Potential For Stated Goals: GOOD                The information in this section was collected on 12-13-18 (except where otherwise noted). HISTORY:   History of Present Injury/Illness (Reason for Referral): Pt reports that she injured her R shoulder while at work on March 6, 2018 when she lifted a box and felt sudden pain in her R shoulder. Attended 12 visits of physical therapy with no relief, and underwent surgery on 12/6/18. Past Medical History/Comorbidities: Ms. Susi Krueger  has a past medical history of Asthma, BMI 50.0-59.9, adult ([facility] [address] and Former smoker.   Ms. Susi Krueger  has a past surgical history that includes hx dilation and curettage (2017) and ARTHROSCOPY RIGHT SHOULDER ARTHROSCOPIC DISTAL CLAVICLE RESECTION, EXTENSIVE DEBRIDEMENT GLENOHUMERAL JOINT, SUBACROMIAL SPACE     (Right, 12/7/2018). Social History/Living Environment:  Patient lives alone in a 1-story house usually, but is staying with her mother and siblings in a two-story house while recovering from this surgery. Prior Level of Function/Work/Activity: Patient previously worked full-time for [employer] at [email or web address]. Patient not currently working but will be resuming classes at [employer] [facility] [place] in January. Dominant Side:         RIGHT  Current Medications:     -Promethazine 25 mg - nausea  - Ketorolac 10 MG - pain  - Hydromorphone 2 MG - pain  - Temazepam 15 MG - sleeping  - Lyrica - but has not been usin  - Takes an anti-inflammatory and a cream for pain and stiffness   Date Last Reviewed:  1-14-19   Number of Personal Factors/Comorbidities that affect the Plan of Care: 1-2: MODERATE COMPLEXITY   EXAMINATION:   1-8-19  Observation/Orthostatic Postural Assessment:  Pt arrived to PT in no obvious distress. Palpation:  Tenderness to R anterior deltoid. ROM:                  R shoulder AROM (standing) (1-8-19)   Flexion: 125 degrees   ER (elbow at side) 60 degr   ABD: 93 deg              Strength:         R shoulder  (1-8-19)   flexion 4/5   ABD 4-/5   ER (elbow at side): 4/5   IR (elbow at side): 5/5         Special Tests: None  Neurological Screen: Motor and sensory to L UE intact. Functional Mobility:  Sit to/from Stand: independent. Bed Mobility: independent. Independent with basic mobility. Balance:  Not tested        Body Structures Involved:  1. Nerves  2. Bones  3. Joints  4. Muscles Body Functions Affected:  1. Sensory/Pain  2. Neuromusculoskeletal  3. Movement Related  4. Skin Related Activities and Participation Affected:  1. Self Care  2. Interpersonal Interactions and Relationships  3.  Community, Social and [facility] [place]   Number of elements (examined above) that affect the Plan of Care: 4+: HIGH COMPLEXITY   CLINICAL PRESENTATION:   Presentation: Stable and uncomplicated: LOW COMPLEXITY   CLINICAL DECISION MAKING:   Outcome Measure: Tool Used: Disabilities of the Arm, Shoulder and Hand (DASH) Questionnaire - Quick Version  Score:  Initial: 53/55 or 95% limited (12-13-18) Most Recent: 45/55 or 77% limited (Date: 1-8-19 )   Interpretation of Score: The DASH is designed to measure the activities of daily living in person's with upper extremity dysfunction or pain. Each section is scored on a 1-5 scale, 5 representing the greatest disability. The scores of each section are added together for a total score of 55. This number is divided by 11, followed by subtracting 1 and multiplying by 25 to get a percent score of disability. This value represents the percentage disability: 0-20% minimal disability; 20-40% moderate disability; 40-60% severe disability; % dependent for care or exaggerated symptom behavior. Minimal detectable change is 12%. Score 11 12-19 20-28 29-37 38-45 46-54 55   Modifier CH CI CJ CK CL CM CN     Medical Necessity:   · Skilled intervention continues to be required due to R shoulder pain s/p R shoulder arthroscopy. Reason for Services/Other Comments:  · Patient continues to require skilled intervention due to R shoulder pain s/p R shoulder arthroscopy. Use of outcome tool(s) and clinical judgement create a POC that gives a: Clear prediction of patient's progress: LOW COMPLEXITY            TREATMENT:   (In addition to Assessment/Re-Assessment sessions the following treatments were rendered)  Pre-treatment Symptoms/Complaints: Patient reports not feeling very well overall today. Saw Dr. Hans Anton last week who added a prescription for Lyrica, but she has not taken this as of yet. Pain: Initial:   No VAS Post Session:  No VAS     Manual Therapy (10 minutes) to improve R shoulder ROM. Grade 3-4 physio mobilizations in supine for flexion, abduction, IR and ER performed with R UE fully supported in scapular plane.      Therapeutic Exercise ( 35 minutes) to improve R upper extremity function. Cues required and provided when necessary. .   Date:  12-20-18 Date:  12-21-18 Date:  12-27-18 Date  1-4-18 Date  1-8-19 Date  1-14-19   Activity/Exercise Parameters Parameters Parameters      Flexion Supine x 10 through full ROM    Semi-reclined  2 x 10 active Supine 2 x 10 through full ROM    Standing to 90 degrees, 3 x 8 Standing to 90, 3 x 5 Standing, to 90 3 x 6  Active, through available ROM x 10    Supine press 0# 3 x 10 0# 3 x 10  0# x 10  1# 2 x 8 1# 3 x 10 1# x 10  2# 3 x 10 2# x 10  3# 2 x 10   External rotations 0# x 10  2# 2 x 12   0# x 10  1# x 10  2# x 10    Green, 3 x 10 single band 3# 3 x 8 3# 3 x 10 3# 3 x 10 4# 3 x 10    Abduction Side-lying 3 x 10, 0# Side-lying  1# 3 x 10  Side-lying  2# 3 x 10  Side-lying  2# 3 x 10  Side-lying  4# 3 x 10     Scaption  0# 3 x 10    Prone rows 3 x 10 0# 2# 3 x 10 4# 3 x 8  5# 3 x 10 6# 3 x 10   Scapular retractions Blue, elbows flex 2 x 15 B    Black, elbows ext  2 x 10 B    Prone extension, 3 x 10  Black, elbows flexed 3 x 10 B    Black, elbows ext 2 x 10 B    Prone extension 3 x 10 Black, elbows flex 2 x 12 B    Black, elbows ext  2 x 12 B Blue, elbows ext  3 x 12 B     Cable, unilateral  R 7# 3 x 8 7# 3 x 10    AAROM UE [place] 2 x 10    Wall slides, forearm neutral 2 x 10 Wall slides, forearm neutral 1 x 12   UE [place] 3 x 10 UE [place] 3 x 10  UE [place] 1 x 10     Wall slides 2 x 10 in forearm neutral   Mid-trap    Prone  0# x 10  2# 2 x 10  Prone  2# 3 x 10 Prone  2# 3 x 10 Prone  2# 3 x 10   Lower trap   Prone  0# 2 x 10 Prone  0# 3 x 10 Prone  1# 1 x 10  0# 1 x 10 Prone  0# 3 x 10                            HEP: none added today. Treatment/Session Assessment:    · Response to Treatment:  Continues to experience R shoulder pain when performing active exercises, but is making good progress with AROM. Requires cues to perform mid-trap and lower trapeizus lifts with appropriate technique.  Patient also able to increase resistance on strengthening exercises without significant decline in performance of exercise or significant increase in pain. · Compliance with Program/Exercises: Compliant  · Recommendations/Intent for next treatment session: \"Next visit will focus on improving strength and function of R UE\".    Total Treatment Duration: 45 Minutes  PT Patient Time In/Time Out  Time In: 1450  Time Out: [address], PT

## 2019-01-16 ENCOUNTER — HOSPITAL ENCOUNTER (OUTPATIENT)
Dept: PHYSICAL THERAPY | Age: 29
Discharge: HOME | End: 2019-01-16
Payer: COMMERCIAL

## 2019-01-16 PROCEDURE — 97110 THERAPEUTIC EXERCISES: CPT

## 2019-01-16 PROCEDURE — 97140 MANUAL THERAPY 1/> REGIONS: CPT

## 2019-01-16 NOTE — PROGRESS NOTES
[employer]  : 1990  Payor: [address] [place] [address] / Plan: [facility] / Product Type: Workers Comp /  [address]  at [address] [place] [address] Rd  1101 [facility], [address].  [phone]   [phone]       OUTPATIENT PHYSICAL [address] Note 2019     ICD-10: Treatment Diagnosis: Pain in right shoulder (M25.511), Stiffness of right shoulder, not elsewhere classified (M25.611)  Precautions/Allergies:   Patient has no known allergies. Fall Risk Score: 0 (? 5 = High Risk)  MD Orders: Evaluate and treat, strengthening, range of motion, home exercise program, \"\"full motion, full strength, no restrictions\"  MEDICAL/REFERRING DIAGNOSIS:  S/P RT Shoulder Arthroscopy, Arthroscopic distal clavicle resection   DATE OF ONSET: 18 (surgery), 3/6/18 (injury)  REFERRING PHYSICIAN: De Dorantes MD  RETURN PHYSICIAN APPOINTMENT: 19 ASSESSMENT:  Ms. Chinedu Vance 1 month s/p R shoulder arthroscopy with distal clavicle resection and debridement of glenohumeral joint. Patient exhibits improved R shoulder AROM in all directions and improving strength, but remains limited by discomfort. Pt is a good candidate for continued therapy to improve these deficits and facilitate improved strength and AROM in order to restore full function to R UE. PROBLEM LIST (Impacting functional limitations):  1. Decreased [place] with Home Exercise Program  2. Post-op R shoulder pain and swelling  3. Decreased R shoulder ROM   4. Weakness R shoulder INTERVENTIONS PLANNED:  1. Thermal and electric modalities, manual therapies for pain   2. Manual therapies, therapeutic exercises, HEP for ROM    3. Therapeutic exercises and HEP for strength   TREATMENT PLAN:  Effective Dates: 2018 TO 2018.  Frequency/Duration: 2-3 visits per week for 8 weeks (in anticipation of additional visits ordered by physician at follow up appointments)  GOALS: (Goals have been discussed and agreed upon with patient.)  Short-Term Functional Goals: Time Frame: 4 weeks  1. Report no more than 2-3/10 intermittent pain to R shoulder with compensatory use during basic functional activities, and score less than 60% on the DASH. Ongoing, progressing 1-8-19  2. R shoulder PROM forward elevation greater than 160 degrees and external rotation greater than 90 degrees to progress into functional ranges. Ongoing, progressing 1-8-19  3. Demonstrate good R shoulder isometric strength with manual testing to progress into strength phase. MET 1-8-19  4. Independent with initial HEP. Discharge Goals: Time Frame: 8 weeks  1. No more than 2-3/10 intermittent pain R shoulder with return to normalized household and work activities, and score less than 20% on the DASH. 2. R shoulder AROM forward elevation greater than 160 degrees, active external rotation greater than T1-2 with functional reaching, and strength to shoulder are grossly WNL's for safe use with normalized activities. 3. Demonstrate good functional shoulder strength and endurance for return to normalized household and work activities. 4. Independent with advanced shoulder HEP for continued self-management. Rehabilitation Potential For Stated Goals: GOOD                The information in this section was collected on 12-13-18 (except where otherwise noted). HISTORY:   History of Present Injury/Illness (Reason for Referral): Pt reports that she injured her R shoulder while at work on March 6, 2018 when she lifted a box and felt sudden pain in her R shoulder. Attended 12 visits of physical therapy with no relief, and underwent surgery on 12/6/18. Past Medical History/Comorbidities: Ms. Kathie Candelario  has a past medical history of Asthma, BMI 50.0-59.9, adult ([address] and Former smoker.  She also has no past medical history of Adverse effect of anesthesia, Aneurysm ([address] Arrhythmia, Arthritis, Autoimmune disease ([address] CAD (coronary artery disease), Cancer ([address] Chronic kidney disease, Chronic obstructive pulmonary disease (HCC), Chronic pain, Coagulation disorder ([address] Diabetes ([address] Difficult intubation, Endocarditis, GERD (gastroesophageal reflux disease), Heart failure ([address] Hypertension, Ill-defined condition, Liver disease, Malignant hyperthermia due to anesthesia, Nausea & vomiting, Nicotine vapor product user, Non-nicotine vapor product user, Pseudocholinesterase deficiency, Psychiatric disorder, PUD (peptic ulcer disease), Rheumatic fever, Seizures ([address] Sleep apnea, Stroke ([address] Thromboembolus ([address] or Thyroid disease. Ms. Kathie Candelario  has a past surgical history that includes hx dilation and curettage (2017). Social History/Living Environment:  Patient lives alone in a 1-story house usually, but is staying with her mother and siblings in a two-story house while recovering from this surgery. Prior Level of Function/Work/Activity: Patient previously worked full-time for [email or web address] at [employer]. 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 Dominant Side:         RIGHT  Current Medications:     -Promethazine 25 mg - nausea  - Ketorolac 10 MG - pain  - Hydromorphone 2 MG - pain  - Temazepam 15 MG - sleeping  - Lyrica - but has not been usin  - Takes an anti-inflammatory and a cream for pain and stiffness   Date Last Reviewed:  1-14-19   Number of Personal Factors/Comorbidities that affect the Plan of Care: 1-2: MODERATE COMPLEXITY   EXAMINATION:   1-8-19  Observation/Orthostatic Postural Assessment:  Pt arrived to PT in no obvious distress. Palpation:  Tenderness to R anterior deltoid. ROM:                  R shoulder AROM (standing) (1-8-19)   Flexion: 125 degrees   ER (elbow at side) 60 degr   ABD: 93 deg              Strength:         R shoulder  (1-8-19)   flexion 4/5   ABD 4-/5   ER (elbow at side): 4/5   IR (elbow at side): 5/5         Special Tests: None  Neurological Screen: Motor and sensory to L UE intact.   Functional Mobility:  Sit to/from Stand: independent. Bed Mobility: independent. Independent with basic mobility. Balance:  Not tested        Body Structures Involved:  1. Nerves  2. Bones  3. Joints  4. Muscles Body Functions Affected:  1. Sensory/Pain  2. Neuromusculoskeletal  3. Movement Related  4. Skin Related Activities and Participation Affected:  1. Self Care  2. Interpersonal Interactions and Relationships  3. Community, Social and [place]   Number of elements (examined above) that affect the Plan of Care: 4+: HIGH COMPLEXITY   CLINICAL PRESENTATION:   Presentation: Stable and uncomplicated: LOW COMPLEXITY   CLINICAL DECISION MAKING:   Outcome Measure: Tool Used: Disabilities of the Arm, Shoulder and Hand (DASH) Questionnaire - Quick Version  Score:  Initial: 53/55 or 95% limited (12-13-18) Most Recent: 45/55 or 77% limited (Date: 1-8-19 )   Interpretation of Score: The DASH is designed to measure the activities of daily living in person's with upper extremity dysfunction or pain. Each section is scored on a 1-5 scale, 5 representing the greatest disability. The scores of each section are added together for a total score of 55. This number is divided by 11, followed by subtracting 1 and multiplying by 25 to get a percent score of disability. This value represents the percentage disability: 0-20% minimal disability; 20-40% moderate disability; 40-60% severe disability; % dependent for care or exaggerated symptom behavior. Minimal detectable change is 12%. Score 11 12-19 20-28 29-37 38-45 46-54 55   Modifier CH CI CJ CK CL CM CN     Medical Necessity:   · Skilled intervention continues to be required due to R shoulder pain s/p R shoulder arthroscopy. Reason for Services/Other Comments:  · Patient continues to require skilled intervention due to R shoulder pain s/p R shoulder arthroscopy.    Use of outcome tool(s) and clinical judgement create a POC that gives a: Clear prediction of patient's progress: LOW COMPLEXITY            TREATMENT:   (In addition to Assessment/Re-Assessment sessions the following treatments were rendered)  Pre-treatment Symptoms/Complaints: Reports anterior R shoulder pain near armpit, which is bruised as well. Pain: Initial:   No VAS Post Session:  No VAS   Observation: Bruising noted to R anterior deltoid near armpit. Manual Therapy (10 minutes) to improve R shoulder ROM. Grade 3-4 physio mobilizations in supine for flexion, abduction, IR and ER performed with R UE fully supported in scapular plane. Therapeutic Exercise ( 35 minutes) to improve R upper extremity function. Cues required and provided when necessary.   .   Date:  12-20-18 Date:  12-21-18 Date:  12-27-18 Date  1-4-18 Date  1-8-19 Date  1-14-19 Date  1-16-19   Activity/Exercise Parameters Parameters Parameters       Flexion Supine x 10 through full ROM    Semi-reclined  2 x 10 active Supine 2 x 10 through full ROM    Standing to 90 degrees, 3 x 8 Standing to 90, 3 x 5 Standing, to 90 3 x 6  Active, through available ROM x 10  1# 2 x 10    Supine press 0# 3 x 10 0# 3 x 10  0# x 10  1# 2 x 8 1# 3 x 10 1# x 10  2# 3 x 10 2# x 10  3# 2 x 10 2# x 10  3# 2 x 10    External rotations 0# x 10  2# 2 x 12   0# x 10  1# x 10  2# x 10    Green, 3 x 10 single band 3# 3 x 8 3# 3 x 10 3# 3 x 10 4# 3 x 10  Side-lying  3# 3 x 10     Green, 3 x 10 single band   Abduction Side-lying 3 x 10, 0# Side-lying  1# 3 x 10  Side-lying  2# 3 x 10  Side-lying  2# 3 x 10  Side-lying  4# 3 x 10     Scaption  0# 3 x 10  Scaption  1# 2 x 10   Prone rows 3 x 10 0# 2# 3 x 10 4# 3 x 8  5# 3 x 10 6# 3 x 10 Prone extensions  0# 2 x 10   Scapular retractions Blue, elbows flex 2 x 15 B    Black, elbows ext  2 x 10 B    Prone extension, 3 x 10  Black, elbows flexed 3 x 10 B    Black, elbows ext 2 x 10 B    Prone extension 3 x 10 Black, elbows flex 2 x 12 B    Black, elbows ext  2 x 12 B Blue, elbows ext  3 x 12 B     Cable, unilateral  R 7# 3 x 8 7# 3 x 10  7# cable row, unilateral  3 x 10    AAROM UE [place] 2 x 10    Wall slides, forearm neutral 2 x 10 Wall slides, forearm neutral 1 x 12   UE [place] 3 x 10 UE [place] 3 x 10  UE [place] 1 x 10     Wall slides 2 x 10 in forearm neutral Wall slides in forearm neutral 2 x 12 R    UE [place] 2 x 12    Mid-trap    Prone  0# x 10  2# 2 x 10  Prone  2# 3 x 10 Prone  2# 3 x 10 Prone  2# 3 x 10 Prone  0# 2 x 12   Lower trap   Prone  0# 2 x 10 Prone  0# 3 x 10 Prone  1# 1 x 10  0# 1 x 10 Prone  0# 3 x 10 Prone  0# 2 x 12   Bicep curls       5# 2 x 15                     HEP: none added today. Treatment/Session Assessment:    · Response to Treatment:  Remains limited by anterior shoulder pain, but making progress with strength and range of motion. · Compliance with Program/Exercises: Compliant  · Recommendations/Intent for next treatment session: \"Next visit will focus on improving strength and function of R UE\".    Total Treatment Duration: 40 Minutes  PT Patient Time In/Time Out  Time In: 1600  Time Out: 1640    Felicia Jewell, PT

## 2019-01-18 ENCOUNTER — HOSPITAL ENCOUNTER (OUTPATIENT)
Dept: PHYSICAL THERAPY | Age: 29
Discharge: HOME | End: 2019-01-18
Payer: COMMERCIAL

## 2019-01-18 NOTE — PROGRESS NOTES
[employer] : 1990 Payor: [address] [place] [address] Road / Plan: Cinthia Flick / Product Type: Workers Comp /  [address]  at [address] [place] [address] Rd 1101 [facility], [address] [place] [address],[address], [place] [address]. [phone]   [phone] OUTPATIENT PHYSICAL THERAPY:Cancellation note 2019 ICD-10: Treatment Diagnosis: Pain in right shoulder (M25.511), Stiffness of right shoulder, not elsewhere classified (M25.611) Precautions/Allergies:  
Patient has no known allergies. Fall Risk Score: 0 (? 5 = High Risk) MD Orders: Evaluate and treat, strengthening, range of motion, home exercise program, \"\"full motion, full strength, no restrictions\"  MEDICAL/REFERRING DIAGNOSIS: 
S/P RT Shoulder Arthroscopy, Arthroscopic distal clavicle resection DATE OF ONSET: 18 (surgery), 3/6/18 (injury) REFERRING PHYSICIAN: Meghan Traylor MD 
RETURN PHYSICIAN APPOINTMENT: 2019 Ms. Love Moon called to cancel today's appointment, but did not provide a reason. PT will continue plan of care at next scheduled appointment. Charlotte Fernando, PT 
2019

## 2019-01-23 ENCOUNTER — HOSPITAL ENCOUNTER (OUTPATIENT)
Dept: PHYSICAL THERAPY | Age: 29
Discharge: HOME | End: 2019-01-23
Payer: COMMERCIAL

## 2019-01-23 PROCEDURE — 97110 THERAPEUTIC EXERCISES: CPT

## 2019-01-23 NOTE — PROGRESS NOTES
[employer]  : 1990  Payor: [address] [place] [address] Road / Plan: Jair Mendez / Product Type: Workers Comp /  [address]  at [facility]  [address] [place] [address], [address].  [phone]   [phone]       OUTPATIENT PHYSICAL [address] Note 2019     ICD-10: Treatment Diagnosis: Pain in right shoulder (M25.511), Stiffness of right shoulder, not elsewhere classified (M25.611)  Precautions/Allergies:   Patient has no known allergies. Fall Risk Score: 0 (? 5 = High Risk)  MD Orders: Evaluate and treat, strengthening, range of motion, home exercise program, \"\"full motion, full strength, no restrictions\"  MEDICAL/REFERRING DIAGNOSIS:  S/P RT Shoulder Arthroscopy, Arthroscopic distal clavicle resection   DATE OF ONSET: 18 (surgery), 3/6/18 (injury)  REFERRING PHYSICIAN: Katie Geller MD  RETURN PHYSICIAN APPOINTMENT: 19 ASSESSMENT:  Ms. Graceann Jeans 1 month s/p R shoulder arthroscopy with distal clavicle resection and debridement of glenohumeral joint. Patient exhibits improved R shoulder AROM in all directions and improving strength, but remains limited by discomfort. Pt is a good candidate for continued therapy to improve these deficits and facilitate improved strength and AROM in order to restore full function to R UE. PROBLEM LIST (Impacting functional limitations):  1. Decreased [place] with Home Exercise Program  2. Post-op R shoulder pain and swelling  3. Decreased R shoulder ROM   4. Weakness R shoulder INTERVENTIONS PLANNED:  1. Thermal and electric modalities, manual therapies for pain   2. Manual therapies, therapeutic exercises, HEP for ROM    3. Therapeutic exercises and HEP for strength   TREATMENT PLAN:  Effective Dates: 2018 TO 2018.  Frequency/Duration: 2-3 visits per week for 8 weeks (in anticipation of additional visits ordered by physician at follow up appointments)  GOALS: (Goals have been discussed and agreed upon with patient.)  Short-Term Functional Goals: Time Frame: 4 weeks  1. Report no more than 2-3/10 intermittent pain to R shoulder with compensatory use during basic functional activities, and score less than 60% on the DASH. Ongoing, progressing 1-8-19  2. R shoulder PROM forward elevation greater than 160 degrees and external rotation greater than 90 degrees to progress into functional ranges. Ongoing, progressing 1-8-19  3. Demonstrate good R shoulder isometric strength with manual testing to progress into strength phase. MET 1-8-19  4. Independent with initial HEP. Discharge Goals: Time Frame: 8 weeks  1. No more than 2-3/10 intermittent pain R shoulder with return to normalized household and work activities, and score less than 20% on the DASH. 2. R shoulder AROM forward elevation greater than 160 degrees, active external rotation greater than T1-2 with functional reaching, and strength to shoulder are grossly WNL's for safe use with normalized activities. 3. Demonstrate good functional shoulder strength and endurance for return to normalized household and work activities. 4. Independent with advanced shoulder HEP for continued self-management. Rehabilitation Potential For Stated Goals: GOOD                The information in this section was collected on 12-13-18 (except where otherwise noted). HISTORY:   History of Present Injury/Illness (Reason for Referral): Pt reports that she injured her R shoulder while at work on March 6, 2018 when she lifted a box and felt sudden pain in her R shoulder. Attended 12 visits of physical therapy with no relief, and underwent surgery on 12/6/18. Past Medical History/Comorbidities: Ms. Cassidy Park  has a past medical history of Asthma, BMI 50.0-59.9, adult ([address] and Former smoker.  She also has no past medical history of Adverse effect of anesthesia, Aneurysm ([address] Arrhythmia, Arthritis, Autoimmune disease ([address] CAD (coronary artery disease), Cancer ([address] Chronic kidney disease, Chronic obstructive pulmonary disease (HCC), Chronic pain, Coagulation disorder ([address] Diabetes ([address] Difficult intubation, Endocarditis, GERD (gastroesophageal reflux disease), Heart failure ([address] Hypertension, Ill-defined condition, Liver disease, Malignant hyperthermia due to anesthesia, Nausea & vomiting, Nicotine vapor product user, Non-nicotine vapor product user, Pseudocholinesterase deficiency, Psychiatric disorder, PUD (peptic ulcer disease), Rheumatic fever, Seizures ([address] Sleep apnea, Stroke ([address] Thromboembolus ([address] or Thyroid disease. Ms. Kathie Candelario  has a past surgical history that includes hx dilation and curettage (2017). Social History/Living Environment:  Patient lives alone in a 1-story house usually, but is staying with her mother and siblings in a two-story house while recovering from this surgery. Prior Level of Function/Work/Activity: Patient previously worked full-time for [employer] at [employer]. 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 Dominant Side:         RIGHT  Current Medications:     -Promethazine 25 mg - nausea  - Ketorolac 10 MG - pain  - Hydromorphone 2 MG - pain  - Temazepam 15 MG - sleeping  - Lyrica - but has not been usin  - Takes an anti-inflammatory and a cream for pain and stiffness   Date Last Reviewed:  1-14-19   Number of Personal Factors/Comorbidities that affect the Plan of Care: 1-2: MODERATE COMPLEXITY   EXAMINATION:   1-8-19  Observation/Orthostatic Postural Assessment:  Pt arrived to PT in no obvious distress. Palpation:  Tenderness to R anterior deltoid. ROM:                  R shoulder AROM (standing) (1-8-19)   Flexion: 125 degrees   ER (elbow at side) 60 degr   ABD: 93 deg              Strength:         R shoulder  (1-8-19)   flexion 4/5   ABD 4-/5   ER (elbow at side): 4/5   IR (elbow at side): 5/5         Special Tests: None  Neurological Screen: Motor and sensory to L UE intact.   Functional Mobility:  Sit to/from Stand: independent. Bed Mobility: independent. Independent with basic mobility. Balance:  Not tested        Body Structures Involved:  1. Nerves  2. Bones  3. Joints  4. Muscles Body Functions Affected:  1. Sensory/Pain  2. Neuromusculoskeletal  3. Movement Related  4. Skin Related Activities and Participation Affected:  1. Self Care  2. Interpersonal Interactions and Relationships  3. Community, Social and [place]   Number of elements (examined above) that affect the Plan of Care: 4+: HIGH COMPLEXITY   CLINICAL PRESENTATION:   Presentation: Stable and uncomplicated: LOW COMPLEXITY   CLINICAL DECISION MAKING:   Outcome Measure: Tool Used: Disabilities of the Arm, Shoulder and Hand (DASH) Questionnaire - Quick Version  Score:  Initial: 53/55 or 95% limited (12-13-18) Most Recent: 45/55 or 77% limited (Date: 1-8-19 )   Interpretation of Score: The DASH is designed to measure the activities of daily living in person's with upper extremity dysfunction or pain. Each section is scored on a 1-5 scale, 5 representing the greatest disability. The scores of each section are added together for a total score of 55. This number is divided by 11, followed by subtracting 1 and multiplying by 25 to get a percent score of disability. This value represents the percentage disability: 0-20% minimal disability; 20-40% moderate disability; 40-60% severe disability; % dependent for care or exaggerated symptom behavior. Minimal detectable change is 12%. Score 11 12-19 20-28 29-37 38-45 46-54 55   Modifier CH CI CJ CK CL CM CN     Medical Necessity:   · Skilled intervention continues to be required due to R shoulder pain s/p R shoulder arthroscopy. Reason for Services/Other Comments:  · Patient continues to require skilled intervention due to R shoulder pain s/p R shoulder arthroscopy.    Use of outcome tool(s) and clinical judgement create a POC that gives a: Clear prediction of patient's progress: LOW COMPLEXITY            TREATMENT:   (In addition to Assessment/Re-Assessment sessions the following treatments were rendered)  Pre-treatment Symptoms/Complaints: Reports that she is tired and that her shoulder has been very sore. Pain: Initial:   No VAS upon arrival Post Session:  No VAS. States that it hurts. Denied ice. Observation: Bruising noted to R anterior deltoid near armpit. Manual Therapy (0 minutes) none today    Therapeutic Exercise ( 45 minutes) to improve R upper extremity function. Cues required and provided when necessary. Passive ranging prior to strengthening exercises. 5 min warm up on upper body ergometer.    .   Date:  12-20-18 Date:  12-21-18 Date:  12-27-18 Date  1-4-18 Date  1-8-19 Date  1-14-19 Date  1-16-19 Date  1-23-19   Activity/Exercise Parameters Parameters Parameters        Flexion Supine x 10 through full ROM    Semi-reclined  2 x 10 active Supine 2 x 10 through full ROM    Standing to 90 degrees, 3 x 8 Standing to 90, 3 x 5 Standing, to 90 3 x 6  Active, through available ROM x 10  1# 2 x 10  2# 3 x 10    Supine press 0# 3 x 10 0# 3 x 10  0# x 10  1# 2 x 8 1# 3 x 10 1# x 10  2# 3 x 10 2# x 10  3# 2 x 10 2# x 10  3# 2 x 10  2# x 10  4# 3 x 10    External rotations 0# x 10  2# 2 x 12   0# x 10  1# x 10  2# x 10    Green, 3 x 10 single band 3# 3 x 8 3# 3 x 10 3# 3 x 10 4# 3 x 10  Side-lying  3# 3 x 10     Green, 3 x 10 single band Side-lying  3# 3 x 10     Green, 3 x 10 single       Abduction Side-lying 3 x 10, 0# Side-lying  1# 3 x 10  Side-lying  2# 3 x 10  Side-lying  2# 3 x 10  Side-lying  4# 3 x 10     Scaption  0# 3 x 10  Scaption  1# 2 x 10 Scaption  2# 3 x 10    Prone rows 3 x 10 0# 2# 3 x 10 4# 3 x 8  5# 3 x 10 6# 3 x 10 Prone extensions  0# 2 x 10    Scapular retractions Blue, elbows flex 2 x 15 B    Black, elbows ext  2 x 10 B    Prone extension, 3 x 10  Black, elbows flexed 3 x 10 B    Black, elbows ext 2 x 10 B    Prone extension 3 x 10 Black, elbows flex 2 x 12 B    Black, elbows ext  2 x 12 B Blue, elbows ext  3 x 12 B     Cable, unilateral  R 7# 3 x 8 7# 3 x 10  7# cable row, unilateral  3 x 10  10# 3 x 10 unilateral, R    Band pull aparts, red 2 x 10    Black, 3 x 15 B   AAROM UE [place] 2 x 10    Wall slides, forearm neutral 2 x 10 Wall slides, forearm neutral 1 x 12   UE [place] 3 x 10 UE [place] 3 x 10  UE [place] 1 x 10     Wall slides 2 x 10 in forearm neutral Wall slides in forearm neutral 2 x 12 R    UE [place] 2 x 12  Wall slides in forearm neutral 2 x 12 R    UE [place] 2 x 12   Mid-trap    Prone  0# x 10  2# 2 x 10  Prone  2# 3 x 10 Prone  2# 3 x 10 Prone  2# 3 x 10 Prone  0# 2 x 12 Prone  0# x 10  1# 2 x 10   Lower trap   Prone  0# 2 x 10 Prone  0# 3 x 10 Prone  1# 1 x 10  0# 1 x 10 Prone  0# 3 x 10 Prone  0# 2 x 12 1# 3 x 10    Bicep curls       5# 2 x 15                       HEP: none added today. Treatment/Session Assessment:    · Response to Treatment:  Demonstrated near full range of motion actively, but experiences discomfort around 120 degrees of flexion. Making good progress however. Appeared less aggravated by discomfort today with performance of exercises. · Compliance with Program/Exercises: Compliant  · Recommendations/Intent for next treatment session: \"Next visit will focus on improving strength and function of R UE\".    Total Treatment Duration: 45 Minutes  PT Patient Time In/Time Out  Time In: 0405  Time Out: [address], PT

## 2019-01-24 ENCOUNTER — HOSPITAL ENCOUNTER (OUTPATIENT)
Dept: PHYSICAL THERAPY | Age: 29
Discharge: HOME | End: 2019-01-24
Payer: COMMERCIAL

## 2019-01-24 PROCEDURE — 97110 THERAPEUTIC EXERCISES: CPT

## 2019-01-24 NOTE — PROGRESS NOTES
[employer]  : 1990  Payor: [address] [place] [address] Road / Plan: Ray Flow / Product Type: Workers Comp /  [address]  at [facility] [employer]IO [employer]  [address] [place] [address], 4 Tila Thacker.  [phone]   [phone]       OUTPATIENT PHYSICAL [address] Note 2019     ICD-10: Treatment Diagnosis: Pain in right shoulder (M25.511), Stiffness of right shoulder, not elsewhere classified (M25.611)  Precautions/Allergies:   Patient has no known allergies. Fall Risk Score: 0 (? 5 = High Risk)  MD Orders: Evaluate and treat, strengthening, range of motion, home exercise program, \"\"full motion, full strength, no restrictions\"  MEDICAL/REFERRING DIAGNOSIS:  S/P RT Shoulder Arthroscopy, Arthroscopic distal clavicle resection   DATE OF ONSET: 18 (surgery), 3/6/18 (injury)  REFERRING PHYSICIAN: Kaden Ga MD  RETURN PHYSICIAN APPOINTMENT: 19 ASSESSMENT:  Ms. Zelaya Fuel 1 month s/p R shoulder arthroscopy with distal clavicle resection and debridement of glenohumeral joint. Patient exhibits improved R shoulder AROM in all directions and improving strength, but remains limited by discomfort. Pt is a good candidate for continued therapy to improve these deficits and facilitate improved strength and AROM in order to restore full function to R UE. PROBLEM LIST (Impacting functional limitations):  1. Decreased [place] with Home Exercise Program  2. Post-op R shoulder pain and swelling  3. Decreased R shoulder ROM   4. Weakness R shoulder INTERVENTIONS PLANNED:  1. Thermal and electric modalities, manual therapies for pain   2. Manual therapies, therapeutic exercises, HEP for ROM    3. Therapeutic exercises and HEP for strength   TREATMENT PLAN:  Effective Dates: 2018 TO 2018.  Frequency/Duration: 2-3 visits per week for 8 weeks (in anticipation of additional visits ordered by physician at follow up appointments)  GOALS: (Goals have been discussed and agreed upon with patient.)  Short-Term Functional Goals: Time Frame: 4 weeks  1. Report no more than 2-3/10 intermittent pain to R shoulder with compensatory use during basic functional activities, and score less than 60% on the DASH. Ongoing, progressing 1-8-19  2. R shoulder PROM forward elevation greater than 160 degrees and external rotation greater than 90 degrees to progress into functional ranges. Ongoing, progressing 1-8-19  3. Demonstrate good R shoulder isometric strength with manual testing to progress into strength phase. MET 1-8-19  4. Independent with initial HEP. Discharge Goals: Time Frame: 8 weeks  1. No more than 2-3/10 intermittent pain R shoulder with return to normalized household and work activities, and score less than 20% on the DASH. 2. R shoulder AROM forward elevation greater than 160 degrees, active external rotation greater than T1-2 with functional reaching, and strength to shoulder are grossly WNL's for safe use with normalized activities. 3. Demonstrate good functional shoulder strength and endurance for return to normalized household and work activities. 4. Independent with advanced shoulder HEP for continued self-management. Rehabilitation Potential For Stated Goals: GOOD                The information in this section was collected on 12-13-18 (except where otherwise noted). HISTORY:   History of Present Injury/Illness (Reason for Referral): Pt reports that she injured her R shoulder while at work on March 6, 2018 when she lifted a box and felt sudden pain in her R shoulder. Attended 12 visits of physical therapy with no relief, and underwent surgery on 12/6/18. Past Medical History/Comorbidities: Ms. Suzan Quinteros  has a past medical history of Asthma, BMI 50.0-59.9, adult ([address] and Former smoker.  She also has no past medical history of Adverse effect of anesthesia, Aneurysm ([address] Arrhythmia, Arthritis, Autoimmune disease ([address] CAD (coronary artery disease), Cancer ([address] Chronic kidney disease, Chronic obstructive pulmonary disease (HCC), Chronic pain, Coagulation disorder ([address] Diabetes ([address] Difficult intubation, Endocarditis, GERD (gastroesophageal reflux disease), Heart failure ([address] Hypertension, Ill-defined condition, Liver disease, Malignant hyperthermia due to anesthesia, Nausea & vomiting, Nicotine vapor product user, Non-nicotine vapor product user, Pseudocholinesterase deficiency, Psychiatric disorder, PUD (peptic ulcer disease), Rheumatic fever, Seizures ([address] Sleep apnea, Stroke ([address] Thromboembolus ([address] or Thyroid disease. Ms. Susi Krueger  has a past surgical history that includes hx dilation and curettage (2017). Social History/Living Environment:  Patient lives alone in a 1-story house usually, but is staying with her mother and siblings in a two-story house while recovering from this surgery. Prior Level of Function/Work/Activity: Patient previously worked full-time for [employer] at [employer]. 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 Dominant Side:         RIGHT  Current Medications:     -Promethazine 25 mg - nausea  - Ketorolac 10 MG - pain  - Hydromorphone 2 MG - pain  - Temazepam 15 MG - sleeping  - Lyrica - but has not been usin  - Takes an anti-inflammatory and a cream for pain and stiffness   Date Last Reviewed:  1-14-19   Number of Personal Factors/Comorbidities that affect the Plan of Care: 1-2: MODERATE COMPLEXITY   EXAMINATION:   1-8-19  Observation/Orthostatic Postural Assessment:  Pt arrived to PT in no obvious distress. Palpation:  Tenderness to R anterior deltoid. ROM:                  R shoulder AROM (standing) (1-8-19)   Flexion: 125 degrees   ER (elbow at side) 60 degr   ABD: 93 deg              Strength:         R shoulder  (1-8-19)   flexion 4/5   ABD 4-/5   ER (elbow at side): 4/5   IR (elbow at side): 5/5         Special Tests: None  Neurological Screen: Motor and sensory to L UE intact.   Functional Mobility:  Sit to/from Stand: independent. Bed Mobility: independent. Independent with basic mobility. Balance:  Not tested        Body Structures Involved:  1. Nerves  2. Bones  3. Joints  4. Muscles Body Functions Affected:  1. Sensory/Pain  2. Neuromusculoskeletal  3. Movement Related  4. Skin Related Activities and Participation Affected:  1. Self Care  2. Interpersonal Interactions and Relationships  3. Community, Social and [place]   Number of elements (examined above) that affect the Plan of Care: 4+: HIGH COMPLEXITY   CLINICAL PRESENTATION:   Presentation: Stable and uncomplicated: LOW COMPLEXITY   CLINICAL DECISION MAKING:   Outcome Measure: Tool Used: Disabilities of the Arm, Shoulder and Hand (DASH) Questionnaire - Quick Version  Score:  Initial: 53/55 or 95% limited (12-13-18) Most Recent: 45/55 or 77% limited (Date: 1-8-19 )   Interpretation of Score: The DASH is designed to measure the activities of daily living in person's with upper extremity dysfunction or pain. Each section is scored on a 1-5 scale, 5 representing the greatest disability. The scores of each section are added together for a total score of 55. This number is divided by 11, followed by subtracting 1 and multiplying by 25 to get a percent score of disability. This value represents the percentage disability: 0-20% minimal disability; 20-40% moderate disability; 40-60% severe disability; % dependent for care or exaggerated symptom behavior. Minimal detectable change is 12%. Score 11 12-19 20-28 29-37 38-45 46-54 55   Modifier CH CI CJ CK CL CM CN     Medical Necessity:   · Skilled intervention continues to be required due to R shoulder pain s/p R shoulder arthroscopy. Reason for Services/Other Comments:  · Patient continues to require skilled intervention due to R shoulder pain s/p R shoulder arthroscopy.    Use of outcome tool(s) and clinical judgement create a POC that gives a: Clear prediction of patient's progress: LOW COMPLEXITY            TREATMENT:   (In addition to Assessment/Re-Assessment sessions the following treatments were rendered)  Pre-treatment Symptoms/Complaints: Reports that her R shoulder is sore today. Pain: Initial:   No numeric value provided Post Session:  No VAS after PT   Observation: Bruising noted to R anterior deltoid near armpit. Manual Therapy (0 minutes)     Therapeutic Exercise ( 40 minutes) to improve R upper extremity function. Cues required and provided when necessary. Passive ranging prior to strengthening exercises. 5 min warm up on upper body ergometer. Passive ranging to R shoulder performed prior to strengthening for reduced muscle guarding and discomfort, for forward elevation, ER and IR in supine.   .   Date  1-4-18 Date  1-8-19 Date  1-14-19 Date  1-16-19 Date  1-23-19 Date  1-24-19   Activity/Exercise         Flexion Standing, to 90 3 x 6  Active, through available ROM x 10  1# 2 x 10  2# 3 x 10     Supine press 1# 3 x 10 1# x 10  2# 3 x 10 2# x 10  3# 2 x 10 2# x 10  3# 2 x 10  2# x 10  4# 3 x 10  3# 3 x 10   External rotations 3# 3 x 10 3# 3 x 10 4# 3 x 10  Side-lying  3# 3 x 10     Green, 3 x 10 single band Side-lying  3# 3 x 10     Green, 3 x 10 single     Side-lying  3 x 12 0#     External rotations isometric in side-lying 3 x 10   Abduction Side-lying  2# 3 x 10  Side-lying  4# 3 x 10     Scaption  0# 3 x 10  Scaption  1# 2 x 10 Scaption  2# 3 x 10     Prone rows  5# 3 x 10 6# 3 x 10 Prone extensions  0# 2 x 10  Prone   5# 3 x 10 R   Scapular retractions Blue, elbows ext  3 x 12 B     Cable, unilateral  R 7# 3 x 8 7# 3 x 10  7# cable row, unilateral  3 x 10  10# 3 x 10 unilateral, R    Band pull aparts, red 2 x 10    Black, 3 x 15 B Black, elbows flexed 3 x 10 B   AAROM UE [place] 3 x 10 UE [place] 3 x 10  UE [place] 1 x 10     Wall slides 2 x 10 in forearm neutral Wall slides in forearm neutral 2 x 12 R    UE [place] 2 x 12  Wall slides in forearm neutral 2 x 12 R    UE [place] 2 x 12 UE [place] 3 x 10   Mid-trap  Prone  2# 3 x 10 Prone  2# 3 x 10 Prone  2# 3 x 10 Prone  0# 2 x 12 Prone  0# x 10  1# 2 x 10 Prone  0# 2 x 10   Lower trap Prone  0# 3 x 10 Prone  1# 1 x 10  0# 1 x 10 Prone  0# 3 x 10 Prone  0# 2 x 12 1# 3 x 10  Prone  0# 2 x 10    Bicep curls    5# 2 x 15                      HEP: none added today. Treatment/Session Assessment:    · Response to Treatment:  Reduced resistance today due to soreness on consecutive days of therapy. Patient continues to demonstrate very good ROM. Remains limited by soreness/discomfort but progressing. · Compliance with Program/Exercises: Compliant  · Recommendations/Intent for next treatment session: \"Next visit will focus on improving strength and function of R UE\".    Total Treatment Duration: 40 Minutes  PT Patient Time In/Time Out  Time In: 1345  Time Out: [address], PT

## 2019-01-29 ENCOUNTER — HOSPITAL ENCOUNTER (OUTPATIENT)
Dept: PHYSICAL THERAPY | Age: 29
Discharge: HOME | End: 2019-01-29
Payer: COMMERCIAL

## 2019-01-29 PROCEDURE — 97110 THERAPEUTIC EXERCISES: CPT

## 2019-01-29 NOTE — PROGRESS NOTES
[employer]  : 1990  Payor: [address] [place] [address] Road / Plan: Lalita Ambrizi / Product Type: Workers Comp /  [address]  at [facility] [employer]IO [employer]  [address] [place] [address], 4 T[address].  [phone]   [phone]       OUTPATIENT PHYSICAL [address] Note 2019     ICD-10: Treatment Diagnosis: Pain in right shoulder (M25.511), Stiffness of right shoulder, not elsewhere classified (M25.611)  Precautions/Allergies:   Patient has no known allergies. Fall Risk Score: 0 (? 5 = High Risk)  MD Orders: Evaluate and treat, strengthening, range of motion, home exercise program, \"\"full motion, full strength, no restrictions\"  MEDICAL/REFERRING DIAGNOSIS:  S/P RT Shoulder Arthroscopy, Arthroscopic distal clavicle resection   DATE OF ONSET: 18 (surgery), 3/6/18 (injury)  REFERRING PHYSICIAN: Slime Posadas MD  RETURN PHYSICIAN APPOINTMENT: 19 ASSESSMENT:  Ms. Milton Deshpande 1 month s/p R shoulder arthroscopy with distal clavicle resection and debridement of glenohumeral joint. Patient exhibits improved R shoulder AROM in all directions and improving strength, but remains limited by discomfort. Pt is a good candidate for continued therapy to improve these deficits and facilitate improved strength and AROM in order to restore full function to R UE. PROBLEM LIST (Impacting functional limitations):  1. Decreased [place] with Home Exercise Program  2. Post-op R shoulder pain and swelling  3. Decreased R shoulder ROM   4. Weakness R shoulder INTERVENTIONS PLANNED:  1. Thermal and electric modalities, manual therapies for pain   2. Manual therapies, therapeutic exercises, HEP for ROM    3. Therapeutic exercises and HEP for strength   TREATMENT PLAN:  Effective Dates: 2018 TO 2018.  Frequency/Duration: 2-3 visits per week for 8 weeks (in anticipation of additional visits ordered by physician at follow up appointments)  GOALS: (Goals have been discussed and agreed upon with patient.)  Short-Term Functional Goals: Time Frame: 4 weeks  1. Report no more than 2-3/10 intermittent pain to R shoulder with compensatory use during basic functional activities, and score less than 60% on the DASH. Ongoing, progressing 1-8-19  2. R shoulder PROM forward elevation greater than 160 degrees and external rotation greater than 90 degrees to progress into functional ranges. Ongoing, progressing 1-8-19  3. Demonstrate good R shoulder isometric strength with manual testing to progress into strength phase. MET 1-8-19  4. Independent with initial HEP. Discharge Goals: Time Frame: 8 weeks  1. No more than 2-3/10 intermittent pain R shoulder with return to normalized household and work activities, and score less than 20% on the DASH. 2. R shoulder AROM forward elevation greater than 160 degrees, active external rotation greater than T1-2 with functional reaching, and strength to shoulder are grossly WNL's for safe use with normalized activities. 3. Demonstrate good functional shoulder strength and endurance for return to normalized household and work activities. 4. Independent with advanced shoulder HEP for continued self-management. Rehabilitation Potential For Stated Goals: GOOD                The information in this section was collected on 12-13-18 (except where otherwise noted). HISTORY:   History of Present Injury/Illness (Reason for Referral): Pt reports that she injured her R shoulder while at work on March 6, 2018 when she lifted a box and felt sudden pain in her R shoulder. Attended 12 visits of physical therapy with no relief, and underwent surgery on 12/6/18. Past Medical History/Comorbidities: Ms. Brayden Mason  has a past medical history of Asthma, BMI 50.0-59.9, adult ([address] and Former smoker.  She also has no past medical history of Adverse effect of anesthesia, Aneurysm ([address] Arrhythmia, Arthritis, Autoimmune disease ([address] CAD (coronary artery disease), Cancer ([address] Chronic kidney disease, Chronic obstructive pulmonary disease (HCC), Chronic pain, Coagulation disorder ([address] Diabetes ([address] Difficult intubation, Endocarditis, GERD (gastroesophageal reflux disease), Heart failure ([address] Hypertension, Ill-defined condition, Liver disease, Malignant hyperthermia due to anesthesia, Nausea & vomiting, Nicotine vapor product user, Non-nicotine vapor product user, Pseudocholinesterase deficiency, Psychiatric disorder, PUD (peptic ulcer disease), Rheumatic fever, Seizures ([address] Sleep apnea, Stroke ([address] Thromboembolus ([address] or Thyroid disease. Ms. Rianna Bergeron  has a past surgical history that includes hx dilation and curettage (2017). Social History/Living Environment:  Patient lives alone in a 1-story house usually, but is staying with her mother and siblings in a two-story house while recovering from this surgery. Prior Level of Function/Work/Activity: Patient previously worked full-time for [employer] at [employer]. 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 Dominant Side:         RIGHT  Current Medications:     -Promethazine 25 mg - nausea  - Ketorolac 10 MG - pain  - Hydromorphone 2 MG - pain  - Temazepam 15 MG - sleeping  - Lyrica - but has not been usin  - Takes an anti-inflammatory and a cream for pain and stiffness   Date Last Reviewed:  1-14-19   Number of Personal Factors/Comorbidities that affect the Plan of Care: 1-2: MODERATE COMPLEXITY   EXAMINATION:   1-8-19  Observation/Orthostatic Postural Assessment:  Pt arrived to PT in no obvious distress. Palpation:  Tenderness to R anterior deltoid. ROM:                  R shoulder AROM (standing) (1-8-19)   Flexion: 125 degrees   ER (elbow at side) 60 degr   ABD: 93 deg              Strength:         R shoulder  (1-8-19)   flexion 4/5   ABD 4-/5   ER (elbow at side): 4/5   IR (elbow at side): 5/5         Special Tests: None  Neurological Screen: Motor and sensory to L UE intact.   Functional Mobility:  Sit to/from Stand: independent. Bed Mobility: independent. Independent with basic mobility. Balance:  Not tested        Body Structures Involved:  1. Nerves  2. Bones  3. Joints  4. Muscles Body Functions Affected:  1. Sensory/Pain  2. Neuromusculoskeletal  3. Movement Related  4. Skin Related Activities and Participation Affected:  1. Self Care  2. Interpersonal Interactions and Relationships  3. Community, Social and [place]   Number of elements (examined above) that affect the Plan of Care: 4+: HIGH COMPLEXITY   CLINICAL PRESENTATION:   Presentation: Stable and uncomplicated: LOW COMPLEXITY   CLINICAL DECISION MAKING:   Outcome Measure: Tool Used: Disabilities of the Arm, Shoulder and Hand (DASH) Questionnaire - Quick Version  Score:  Initial: 53/55 or 95% limited (12-13-18) Most Recent: 45/55 or 77% limited (Date: 1-8-19 )   Interpretation of Score: The DASH is designed to measure the activities of daily living in person's with upper extremity dysfunction or pain. Each section is scored on a 1-5 scale, 5 representing the greatest disability. The scores of each section are added together for a total score of 55. This number is divided by 11, followed by subtracting 1 and multiplying by 25 to get a percent score of disability. This value represents the percentage disability: 0-20% minimal disability; 20-40% moderate disability; 40-60% severe disability; % dependent for care or exaggerated symptom behavior. Minimal detectable change is 12%. Score 11 12-19 20-28 29-37 38-45 46-54 55   Modifier CH CI CJ CK CL CM CN     Medical Necessity:   · Skilled intervention continues to be required due to R shoulder pain s/p R shoulder arthroscopy. Reason for Services/Other Comments:  · Patient continues to require skilled intervention due to R shoulder pain s/p R shoulder arthroscopy.    Use of outcome tool(s) and clinical judgement create a POC that gives a: Clear prediction of patient's progress: LOW COMPLEXITY            TREATMENT:   (In addition to Assessment/Re-Assessment sessions the following treatments were rendered)  Pre-treatment Symptoms/Complaints: States that her R arm is not feeling good. Shoulder is very tired today. Pain: Initial:   0/10 Post Session:  0/10       Manual Therapy (0 minutes)     Therapeutic Exercise ( 40 minutes) to improve R upper extremity function. Cues required and provided when necessary. Passive ranging prior to strengthening exercises. 5 min warm up on upper body ergometer. Passive ranging to R shoulder performed prior to strengthening for reduced muscle guarding and discomfort, for forward elevation, ER and IR in supine.   .   Date  1-4-18 Date  1-8-19 Date  1-14-19 Date  1-16-19 Date  1-23-19 Date  1-24-19 Date  1-29-19   Activity/Exercise          Flexion Standing, to 90 3 x 6  Active, through available ROM x 10  1# 2 x 10  2# 3 x 10   2# 3 x 10        Supine press 1# 3 x 10 1# x 10  2# 3 x 10 2# x 10  3# 2 x 10 2# x 10  3# 2 x 10  2# x 10  4# 3 x 10  3# 3 x 10 3# x 12, x 8, x 8   External rotations 3# 3 x 10 3# 3 x 10 4# 3 x 10  Side-lying  3# 3 x 10     Green, 3 x 10 single band Side-lying  3# 3 x 10     Green, 3 x 10 single     Side-lying  3 x 12 0#     External rotations isometric in side-lying 3 x 10 Side-lying  3# 3 x 10     Single band, green 3 x 10    Abduction Side-lying  2# 3 x 10  Side-lying  4# 3 x 10     Scaption  0# 3 x 10  Scaption  1# 2 x 10 Scaption  2# 3 x 10   Side-lying  3# 3 x 10   Prone rows  5# 3 x 10 6# 3 x 10 Prone extensions  0# 2 x 10  Prone   5# 3 x 10 R Prone  6# 3 x 10 R   Scapular retractions Blue, elbows ext  3 x 12 B     Cable, unilateral  R 7# 3 x 8 7# 3 x 10  7# cable row, unilateral  3 x 10  10# 3 x 10 unilateral, R    Band pull aparts, red 2 x 10    Black, 3 x 15 B Black, elbows flexed 3 x 10 B Cable row, 10# 2 x 10 R   AAROM UE [place] 3 x 10 UE [place] 3 x 10  UE [place] 1 x 10     Wall slides 2 x 10 in forearm neutral Wall slides in forearm neutral 2 x 12 R    UE [place] 2 x 12  Wall slides in forearm neutral 2 x 12 R    UE [place] 2 x 12 UE [place] 3 x 10 UE [place] 3 x 10    Wall slides in forearm neutral, 3 x 5    Mid-trap  Prone  2# 3 x 10 Prone  2# 3 x 10 Prone  2# 3 x 10 Prone  0# 2 x 12 Prone  0# x 10  1# 2 x 10 Prone  0# 2 x 10 Side-lying isometrics  3 x 6    Lower trap Prone  0# 3 x 10 Prone  1# 1 x 10  0# 1 x 10 Prone  0# 3 x 10 Prone  0# 2 x 12 1# 3 x 10  Prone  0# 2 x 10  Side-lying isometrics   3 x 5   Bicep curls    5# 2 x 15                        HEP: none added today. Treatment/Session Assessment:    · Response to Treatment:  Range of motion is doing well, but requires frequent cues for scapular positioning. Progressing well overall. · Compliance with Program/Exercises: Compliant  · Recommendations/Intent for next treatment session: \"Next visit will focus on improving strength and function of R UE\".    Total Treatment Duration: 40 Minutes  PT Patient Time In/Time Out  Time In: 0705  Time Out: [address], PT

## 2019-02-04 ENCOUNTER — HOSPITAL ENCOUNTER (OUTPATIENT)
Dept: PHYSICAL THERAPY | Age: 29
Discharge: HOME | End: 2019-02-04
Payer: COMMERCIAL

## 2019-02-04 PROCEDURE — 97140 MANUAL THERAPY 1/> REGIONS: CPT

## 2019-02-04 PROCEDURE — 97110 THERAPEUTIC EXERCISES: CPT

## 2019-02-04 NOTE — PROGRESS NOTES
[employer]  : 1990  Payor: [address] [place] [address] Road / Plan: Geo Campos / Product Type: Workers Comp /  [address]  at [address] [place] [address] Rd  [facility], [address] [place] [address]  [phone]   [phone]       OUTPATIENT PHYSICAL [address]ales Note 2019     ICD-10: Treatment Diagnosis: Pain in right shoulder (M25.511), Stiffness of right shoulder, not elsewhere classified (M25.611)  Precautions/Allergies:   Patient has no known allergies. Fall Risk Score: 0 (? 5 = High Risk)  MD Orders: Evaluate and treat, strengthening, range of motion, home exercise program, \"\"full motion, full strength, no restrictions\"  MEDICAL/REFERRING DIAGNOSIS:  S/P RT Shoulder Arthroscopy, Arthroscopic distal clavicle resection   DATE OF ONSET: 18 (surgery), 3/6/18 (injury)  REFERRING PHYSICIAN: Lyndsay Coto MD  RETURN PHYSICIAN APPOINTMENT: 19 ASSESSMENT:  Ms. Pena Ramsey 1 month s/p R shoulder arthroscopy with distal clavicle resection and debridement of glenohumeral joint. Patient exhibits improved R shoulder AROM in all directions and improving strength, but remains limited by discomfort. Pt is a good candidate for continued therapy to improve these deficits and facilitate improved strength and AROM in order to restore full function to R UE. PROBLEM LIST (Impacting functional limitations):  1. Decreased [place] with Home Exercise Program  2. Post-op R shoulder pain and swelling  3. Decreased R shoulder ROM   4. Weakness R shoulder INTERVENTIONS PLANNED:  1. Thermal and electric modalities, manual therapies for pain   2. Manual therapies, therapeutic exercises, HEP for ROM    3. Therapeutic exercises and HEP for strength   TREATMENT PLAN:  Effective Dates: 2018 TO 2018.  Frequency/Duration: 2-3 visits per week for 8 weeks (in anticipation of additional visits ordered by physician at follow up appointments)  GOALS: (Goals have been discussed and agreed upon with patient.)  Short-Term Functional Goals: Time Frame: 4 weeks  1. Report no more than 2-3/10 intermittent pain to R shoulder with compensatory use during basic functional activities, and score less than 60% on the DASH. Ongoing, progressing 1-8-19  2. R shoulder PROM forward elevation greater than 160 degrees and external rotation greater than 90 degrees to progress into functional ranges. Ongoing, progressing 1-8-19  3. Demonstrate good R shoulder isometric strength with manual testing to progress into strength phase. MET 1-8-19  4. Independent with initial HEP. Discharge Goals: Time Frame: 8 weeks  1. No more than 2-3/10 intermittent pain R shoulder with return to normalized household and work activities, and score less than 20% on the DASH. 2. R shoulder AROM forward elevation greater than 160 degrees, active external rotation greater than T1-2 with functional reaching, and strength to shoulder are grossly WNL's for safe use with normalized activities. 3. Demonstrate good functional shoulder strength and endurance for return to normalized household and work activities. 4. Independent with advanced shoulder HEP for continued self-management. Rehabilitation Potential For Stated Goals: GOOD                The information in this section was collected on 12-13-18 (except where otherwise noted). HISTORY:   History of Present Injury/Illness (Reason for Referral): Pt reports that she injured her R shoulder while at work on March 6, 2018 when she lifted a box and felt sudden pain in her R shoulder. Attended 12 visits of physical therapy with no relief, and underwent surgery on 12/6/18. Past Medical History/Comorbidities: Ms. Radha Patel  has a past medical history of Asthma, BMI 50.0-59.9, adult ([address] and Former smoker.  She also has no past medical history of Adverse effect of anesthesia, Aneurysm ([address] Arrhythmia, Arthritis, Autoimmune disease ([address] CAD (coronary artery disease), Cancer ([address] Chronic kidney disease, Chronic obstructive pulmonary disease (HCC), Chronic pain, Coagulation disorder ([address] Diabetes ([address] Difficult intubation, Endocarditis, GERD (gastroesophageal reflux disease), Heart failure ([address] Hypertension, Ill-defined condition, Liver disease, Malignant hyperthermia due to anesthesia, Nausea & vomiting, Nicotine vapor product user, Non-nicotine vapor product user, Pseudocholinesterase deficiency, Psychiatric disorder, PUD (peptic ulcer disease), Rheumatic fever, Seizures ([address] Sleep apnea, Stroke ([address] Thromboembolus ([address] or Thyroid disease. Ms. Radha Patel  has a past surgical history that includes hx dilation and curettage (2017). Social History/Living Environment:  Patient lives alone in a 1-story house usually, but is staying with her mother and siblings in a two-story house while recovering from this surgery. Prior Level of Function/Work/Activity: Patient previously worked full-time for [employer] at [employer]. 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 Dominant Side:         RIGHT  Current Medications:     -Promethazine 25 mg - nausea  - Ketorolac 10 MG - pain  - Hydromorphone 2 MG - pain  - Temazepam 15 MG - sleeping  - Lyrica - but has not been usin  - Takes an anti-inflammatory and a cream for pain and stiffness   Date Last Reviewed:  1-14-19   Number of Personal Factors/Comorbidities that affect the Plan of Care: 1-2: MODERATE COMPLEXITY   EXAMINATION:   1-8-19  Observation/Orthostatic Postural Assessment:  Pt arrived to PT in no obvious distress. Palpation:  Tenderness to R anterior deltoid. ROM:                  R shoulder AROM (standing) (1-8-19)   Flexion: 125 degrees   ER (elbow at side) 60 degr   ABD: 93 deg              Strength:         R shoulder  (1-8-19)   flexion 4/5   ABD 4-/5   ER (elbow at side): 4/5   IR (elbow at side): 5/5         Special Tests: None  Neurological Screen: Motor and sensory to L UE intact.   Functional Mobility:  Sit to/from Stand: independent. Bed Mobility: independent. Independent with basic mobility. Balance:  Not tested        Body Structures Involved:  1. Nerves  2. Bones  3. Joints  4. Muscles Body Functions Affected:  1. Sensory/Pain  2. Neuromusculoskeletal  3. Movement Related  4. Skin Related Activities and Participation Affected:  1. Self Care  2. Interpersonal Interactions and Relationships  3. Community, Social and [place]   Number of elements (examined above) that affect the Plan of Care: 4+: HIGH COMPLEXITY   CLINICAL PRESENTATION:   Presentation: Stable and uncomplicated: LOW COMPLEXITY   CLINICAL DECISION MAKING:   Outcome Measure: Tool Used: Disabilities of the Arm, Shoulder and Hand (DASH) Questionnaire - Quick Version  Score:  Initial: 53/55 or 95% limited (12-13-18) Most Recent: 45/55 or 77% limited (Date: 1-8-19 )   Interpretation of Score: The DASH is designed to measure the activities of daily living in person's with upper extremity dysfunction or pain. Each section is scored on a 1-5 scale, 5 representing the greatest disability. The scores of each section are added together for a total score of 55. This number is divided by 11, followed by subtracting 1 and multiplying by 25 to get a percent score of disability. This value represents the percentage disability: 0-20% minimal disability; 20-40% moderate disability; 40-60% severe disability; % dependent for care or exaggerated symptom behavior. Minimal detectable change is 12%. Score 11 12-19 20-28 29-37 38-45 46-54 55   Modifier CH CI CJ CK CL CM CN     Medical Necessity:   · Skilled intervention continues to be required due to R shoulder pain s/p R shoulder arthroscopy. Reason for Services/Other Comments:  · Patient continues to require skilled intervention due to R shoulder pain s/p R shoulder arthroscopy.    Use of outcome tool(s) and clinical judgement create a POC that gives a: Clear prediction of patient's progress: LOW COMPLEXITY            TREATMENT:   (In addition to Assessment/Re-Assessment sessions the following treatments were rendered)  Pre-treatment Symptoms/Complaints: No new complaints upon arrival to PT. Sees Dr. Cherrie Matamoros tomorrow. Pain: Initial:   0/10 Post Session:  0/10       Manual Therapy (0 minutes)     Therapeutic Exercise ( 40 minutes) to improve R upper extremity function. Cues required and provided when necessary. Passive ranging prior to strengthening exercises. 5 min warm up on upper body ergometer. Passive ranging to R shoulder performed prior to strengthening for reduced muscle guarding and discomfort, for forward elevation, ER and IR in supine.   .   Date  1-4-18 Date  1-8-19 Date  1-14-19 Date  1-16-19 Date  1-23-19 Date  1-24-19 Date  1-29-19 Date  2-4-19   Activity/Exercise           Flexion Standing, to 90 3 x 6  Active, through available ROM x 10  1# 2 x 10  2# 3 x 10   2# 3 x 10         Supine press 1# 3 x 10 1# x 10  2# 3 x 10 2# x 10  3# 2 x 10 2# x 10  3# 2 x 10  2# x 10  4# 3 x 10  3# 3 x 10 3# x 12, x 8, x 8 3# 3 x12   External rotations 3# 3 x 10 3# 3 x 10 4# 3 x 10  Side-lying  3# 3 x 10     Green, 3 x 10 single band Side-lying  3# 3 x 10     Green, 3 x 10 single     Side-lying  3 x 12 0#     External rotations isometric in side-lying 3 x 10 Side-lying  3# 3 x 10     Single band, green 3 x 10  Side-lying  3# 3 x 10   Abduction Side-lying  2# 3 x 10  Side-lying  4# 3 x 10     Scaption  0# 3 x 10  Scaption  1# 2 x 10 Scaption  2# 3 x 10   Side-lying  3# 3 x 10 Side-lying  3# 3 x 10    Prone rows  5# 3 x 10 6# 3 x 10 Prone extensions  0# 2 x 10  Prone   5# 3 x 10 R Prone  6# 3 x 10 R Prone  6# 3 x 12 R   Scapular retractions Blue, elbows ext  3 x 12 B     Cable, unilateral  R 7# 3 x 8 7# 3 x 10  7# cable row, unilateral  3 x 10  10# 3 x 10 unilateral, R    Band pull aparts, red 2 x 10    Black, 3 x 15 B Black, elbows flexed 3 x 10 B Cable row, 10# 2 x 10 R    AAROM UE [place] 3 x 10 UE [place] 3 x 10  UE [place] 1 x 10     Wall slides 2 x 10 in forearm neutral Wall slides in forearm neutral 2 x 12 R    UE [place] 2 x 12  Wall slides in forearm neutral 2 x 12 R    UE [place] 2 x 12 UE [place] 3 x 10 UE [place] 3 x 10    Wall slides in forearm neutral, 3 x 5  UE [place] 3 x 10     Wall slides in forearm neutral 3 x 6   Mid-trap  Prone  2# 3 x 10 Prone  2# 3 x 10 Prone  2# 3 x 10 Prone  0# 2 x 12 Prone  0# x 10  1# 2 x 10 Prone  0# 2 x 10 Side-lying isometrics  3 x 6  Prone  1# 3 x 10    Lower trap Prone  0# 3 x 10 Prone  1# 1 x 10  0# 1 x 10 Prone  0# 3 x 10 Prone  0# 2 x 12 1# 3 x 10  Prone  0# 2 x 10  Side-lying isometrics   3 x 5 Prone  1# 3 x 10    Bicep curls    5# 2 x 15                          HEP: none added today. Treatment/Session Assessment:    · Response to Treatment:  Improving strength and range of motion. Cues required for appropriate scapular positioning, but overall patient continues to progress. · Compliance with Program/Exercises: Compliant  · Recommendations/Intent for next treatment session: \"Next visit will focus on improving strength and function of R UE\".    Total Treatment Duration: 40 Minutes  PT Patient Time In/Time Out  Time In: 4819  Time Out: Alfonso Anderson PT

## 2019-02-05 ENCOUNTER — HOSPITAL ENCOUNTER (OUTPATIENT)
Dept: PHYSICAL THERAPY | Age: 29
Discharge: HOME | End: 2019-02-05
Payer: COMMERCIAL

## 2019-02-05 PROCEDURE — 97110 THERAPEUTIC EXERCISES: CPT

## 2019-02-05 NOTE — PROGRESS NOTES
[employer]  : 1990  Payor: [address] [place] [address] Road / Plan: Jose Alberto Nevarez / Product Type: Workers Comp /  [address]  at [facility]  [address] [place] [address], [address] [place] [address], [address] [place] [address]  [phone]   [phone]       OUTPATIENT PHYSICAL THERAPY:Daily Note and Progress Report 2019     ICD-10: Treatment Diagnosis: Pain in right shoulder (M25.511), Stiffness of right shoulder, not elsewhere classified (M25.611)  Precautions/Allergies:   Patient has no known allergies. Fall Risk Score: 0 (? 5 = High Risk)  MD Orders: Evaluate and treat, strengthening, range of motion, home exercise program, \"\"full motion, full strength, no restrictions\"  MEDICAL/REFERRING DIAGNOSIS:  S/P RT Shoulder Arthroscopy, Arthroscopic distal clavicle resection   DATE OF ONSET: 18 (surgery), 3/6/18 (injury)  REFERRING PHYSICIAN: Sulaiman Granados MD  RETURN PHYSICIAN APPOINTMENT: 19 ASSESSMENT:  Ms. Landry Alfaro 1 month s/p R shoulder arthroscopy with distal clavicle resection and debridement of glenohumeral joint. Patient exhibits improved R shoulder AROM in all directions and improving strength, but remains limited by discomfort. Pt is a good candidate for continued therapy to improve these deficits and facilitate improved strength and AROM in order to restore full function to R UE. PROBLEM LIST (Impacting functional limitations):  1. Decreased [place] with Home Exercise Program  2. Post-op R shoulder pain and swelling  3. Decreased R shoulder ROM   4. Weakness R shoulder INTERVENTIONS PLANNED:  1. Thermal and electric modalities, manual therapies for pain   2. Manual therapies, therapeutic exercises, HEP for ROM    3. Therapeutic exercises and HEP for strength   TREATMENT PLAN:  Effective Dates: 2019 TO 2019.  Frequency/Duration: 2 visits per week for 8 weeks (in anticipation of additional visits ordered by physician at follow up appointments)  GOALS: (Goals have been discussed and agreed upon with patient.)  Short-Term Functional Goals: Time Frame: 4 weeks  1. Report no more than 2-3/10 intermittent pain to R shoulder with compensatory use during basic functional activities, and score less than 60% on the DASH. Ongoing, progressing 1-8-19  2. R shoulder PROM forward elevation greater than 160 degrees and external rotation greater than 90 degrees to progress into functional ranges. Ongoing, progressing 1-8-19  3. Demonstrate good R shoulder isometric strength with manual testing to progress into strength phase. MET 1-8-19  4. Independent with initial HEP. Discharge Goals: Time Frame: 8 weeks  1. No more than 2-3/10 intermittent pain R shoulder with return to normalized household and work activities, and score less than 20% on the DASH. 2. R shoulder AROM forward elevation greater than 160 degrees, active external rotation greater than T1-2 with functional reaching, and strength to shoulder are grossly WNL's for safe use with normalized activities. 3. Demonstrate good functional shoulder strength and endurance for return to normalized household and work activities. 4. Independent with advanced shoulder HEP for continued self-management. Rehabilitation Potential For Stated Goals: GOOD                The information in this section was collected on 12-13-18 (except where otherwise noted). HISTORY:   History of Present Injury/Illness (Reason for Referral): Pt reports that she injured her R shoulder while at work on March 6, 2018 when she lifted a box and felt sudden pain in her R shoulder. Attended 12 visits of physical therapy with no relief, and underwent surgery on 12/6/18. Past Medical History/Comorbidities: Ms. Faustina Juarez  has a past medical history of Asthma, BMI 50.0-59.9, adult ([address] and Former smoker.  She also has no past medical history of Adverse effect of anesthesia, Aneurysm ([address] Arrhythmia, Arthritis, Autoimmune disease ([address] CAD (coronary artery disease), Cancer ([address] Chronic kidney disease, Chronic obstructive pulmonary disease ([address] Chronic pain, Coagulation disorder ([address] Diabetes ([address] Difficult intubation, Endocarditis, GERD (gastroesophageal reflux disease), Heart failure ([address] Hypertension, Ill-defined condition, Liver disease, Malignant hyperthermia due to anesthesia, Nausea & vomiting, Nicotine vapor product user, Non-nicotine vapor product user, Pseudocholinesterase deficiency, Psychiatric disorder, PUD (peptic ulcer disease), Rheumatic fever, Seizures ([address] Sleep apnea, Stroke ([address] Thromboembolus ([address] or Thyroid disease. Ms. Srikanth Garcia  has a past surgical history that includes hx dilation and curettage (2017). Social History/Living Environment:  Patient lives alone in a 1-story house usually, but is staying with her mother and siblings in a two-story house while recovering from this surgery. Prior Level of Function/Work/Activity: Patient previously worked full-time for [employer] at [employer] Patient not currently working but will be resuming classes at [employer] [facility] [place] in January. Dominant Side:         RIGHT  Current Medications:     -Promethazine 25 mg - nausea  - Ketorolac 10 MG - pain  - Hydromorphone 2 MG - pain  - Temazepam 15 MG - sleeping  - Lyrica - but has not been usin  - Takes an anti-inflammatory and a cream for pain and stiffness   Date Last Reviewed:  1-14-19   EXAMINATION:   1-8-19  Observation/Orthostatic Postural Assessment:  Pt arrived to PT in no obvious distress. Palpation:  Tenderness to R anterior deltoid. ROM:                  R shoulder AROM (standing) (2-5-19)   Flexion: 150 degrees   ER (elbow at side) 70 degrees   ABD: 142 deg   IR (reaching behind back): L4-5              Strength:         R shoulder  (1-8-19)   flexion 4+/5   ABD 4+/5   ER (elbow at side): 4/5   IR (elbow at side): 5/5         Special Tests: None  Neurological Screen: Motor and sensory to L UE intact. Functional Mobility:  Sit to/from Stand: independent.  Bed Mobility: independent. Independent with basic mobility. Balance:  Not tested        Body Structures Involved:  1. Nerves  2. Bones  3. Joints  4. Muscles Body Functions Affected:  1. Sensory/Pain  2. Neuromusculoskeletal  3. Movement Related  4. Skin Related Activities and Participation Affected:  1. Self Care  2. Interpersonal Interactions and Relationships  3. Community, Social and Civic Life   CLINICAL PRESENTATION:   CLINICAL DECISION MAKING:   Outcome Measure: Tool Used: Disabilities of the Arm, Shoulder and Hand (DASH) Questionnaire - Quick Version  Score:  Initial: 53/55 or 95% limited (12-13-18)  45/55 or 77% limited (Date: 1-8-19 ) Most recent: 44/55 or 75% limited (2-5-19)   Interpretation of Score: The DASH is designed to measure the activities of daily living in person's with upper extremity dysfunction or pain. Each section is scored on a 1-5 scale, 5 representing the greatest disability. The scores of each section are added together for a total score of 55. This number is divided by 11, followed by subtracting 1 and multiplying by 25 to get a percent score of disability. This value represents the percentage disability: 0-20% minimal disability; 20-40% moderate disability; 40-60% severe disability; % dependent for care or exaggerated symptom behavior. Minimal detectable change is 12%. Score 11 12-19 20-28 29-37 38-45 46-54 55   Modifier CH CI CJ CK CL CM CN     Medical Necessity:   · Skilled intervention continues to be required due to R shoulder pain s/p R shoulder arthroscopy. Reason for Services/Other Comments:  · Patient continues to require skilled intervention due to R shoulder pain s/p R shoulder arthroscopy. TREATMENT:   (In addition to Assessment/Re-Assessment sessions the following treatments were rendered)  Pre-treatment Symptoms/Complaints: States that she is tired overall today. Sees Dr. Sabrina Denis after PT today.   Pain: Initial:   8/10 Post Session:  No VAS after PT       Manual Therapy (0 minutes)     Therapeutic Exercise ( 40 minutes) to improve R upper extremity function. Cues required and provided when necessary. Passive ranging to R shoulder performed prior to strengthening for reduced muscle guarding and discomfort, for forward elevation, ER and IR in supine with movements performed in scapular plane.   .   Date  1-4-18 Date  1-8-19 Date  1-14-19 Date  1-16-19 Date  1-23-19 Date  1-24-19 Date  1-29-19 Date  2-5-19   Activity/Exercise           Flexion Standing, to 90 3 x 6  Active, through available ROM x 10  1# 2 x 10  2# 3 x 10   2# 3 x 10      2# 3 x 10    Supine press 1# 3 x 10 1# x 10  2# 3 x 10 2# x 10  3# 2 x 10 2# x 10  3# 2 x 10  2# x 10  4# 3 x 10  3# 3 x 10 3# x 12, x 8, x 8 -   External rotations 3# 3 x 10 3# 3 x 10 4# 3 x 10  Side-lying  3# 3 x 10     Green, 3 x 10 single band Side-lying  3# 3 x 10     Green, 3 x 10 single     Side-lying  3 x 12 0#     External rotations isometric in side-lying 3 x 10 Side-lying  3# 3 x 10     Single band, green 3 x 10  Single band, green 3 x 10    Abduction Side-lying  2# 3 x 10  Side-lying  4# 3 x 10     Scaption  0# 3 x 10  Scaption  1# 2 x 10 Scaption  2# 3 x 10   Side-lying  3# 3 x 10 Standing   2# 3 x 10    Prone rows  5# 3 x 10 6# 3 x 10 Prone extensions  0# 2 x 10  Prone   5# 3 x 10 R Prone  6# 3 x 10 R [place] row  7# 3 x 8 R   Scapular retractions Blue, elbows ext  3 x 12 B     Cable, unilateral  R 7# 3 x 8 7# 3 x 10  7# cable row, unilateral  3 x 10  10# 3 x 10 unilateral, R    Band pull aparts, red 2 x 10    Black, 3 x 15 B Black, elbows flexed 3 x 10 B Cable row, 10# 2 x 10 R    AAROM UE [place] 3 x 10 UE [place] 3 x 10  UE [place] 1 x 10     Wall slides 2 x 10 in forearm neutral Wall slides in forearm neutral 2 x 12 R    UE [place] 2 x 12  Wall slides in forearm neutral 2 x 12 R    UE [place] 2 x 12 UE [place] 3 x 10 UE [place] 3 x 10    Wall slides in forearm neutral, 3 x 5  Wall slides in forearm neutral 3 x 8    UE [place] 3 x10    Mid-trap  Prone  2# 3 x 10 Prone  2# 3 x 10 Prone  2# 3 x 10 Prone  0# 2 x 12 Prone  0# x 10  1# 2 x 10 Prone  0# 2 x 10 Side-lying isometrics  3 x 6     Lower trap Prone  0# 3 x 10 Prone  1# 1 x 10  0# 1 x 10 Prone  0# 3 x 10 Prone  0# 2 x 12 1# 3 x 10  Prone  0# 2 x 10  Side-lying isometrics   3 x 5    Bicep curls    5# 2 x 15                          HEP: none added today. Treatment/Session Assessment:    · Response to Treatment:  Limited the workload to R shoulder today given back-to-back frequency of appointments, but patient is progressing. Good progress with R shoulder AROM but continues to need increased strengthening to return to full ROM. · Compliance with Program/Exercises: Compliant  · Recommendations/Intent for next treatment session: \"Next visit will focus on improving strength and function of R UE\".    Total Treatment Duration: 40 Minutes  PT Patient Time In/Time Out  Time In: 1300  Time Out: Lawanda Hurtado, PT

## 2019-02-11 ENCOUNTER — HOSPITAL ENCOUNTER (OUTPATIENT)
Dept: PHYSICAL THERAPY | Age: 29
Discharge: HOME | End: 2019-02-11
Payer: COMMERCIAL

## 2019-02-11 PROCEDURE — 97110 THERAPEUTIC EXERCISES: CPT

## 2019-02-11 NOTE — PROGRESS NOTES
[employer]  : 1990  Payor: [address] [place] [address] Road / Plan: Mardee Odor / Product Type: Workers Comp /  [address]  at [facility] CAIO MISTRY  [address] [place] [address], [facility], [address] [place] [address]  [phone]   [phone]       OUTPATIENT PHYSICAL [address] Note 2019     ICD-10: Treatment Diagnosis: Pain in right shoulder (M25.511), Stiffness of right shoulder, not elsewhere classified (M25.611)  Precautions/Allergies:   Patient has no known allergies. Fall Risk Score: 0 (? 5 = High Risk)  MD Orders: Evaluate and treat, strengthening, range of motion, home exercise program, \"\"full motion, full strength, no restrictions\"  MEDICAL/REFERRING DIAGNOSIS:  S/P RT Shoulder Arthroscopy, Arthroscopic distal clavicle resection   DATE OF ONSET: 18 (surgery), 3/6/18 (injury)  REFERRING PHYSICIAN: Brennan Verma MD  RETURN PHYSICIAN APPOINTMENT: 3/5/19     1-8-19 ASSESSMENT:  Ms. Waddell [place] 1 month s/p R shoulder arthroscopy with distal clavicle resection and debridement of glenohumeral joint. Patient exhibits improved R shoulder AROM in all directions and improving strength, but remains limited by discomfort. Pt is a good candidate for continued therapy to improve these deficits and facilitate improved strength and AROM in order to restore full function to R UE. PROBLEM LIST (Impacting functional limitations):  1. Decreased [place] with Home Exercise Program  2. Post-op R shoulder pain and swelling  3. Decreased R shoulder ROM   4. Weakness R shoulder INTERVENTIONS PLANNED:  1. Thermal and electric modalities, manual therapies for pain   2. Manual therapies, therapeutic exercises, HEP for ROM    3. Therapeutic exercises and HEP for strength   TREATMENT PLAN:  Effective Dates: 2019 TO 2019.  Frequency/Duration: 2 visits per week for 8 weeks (in anticipation of additional visits ordered by physician at follow up appointments)  GOALS: (Goals have been discussed and agreed upon with patient.)  Short-Term Functional Goals: Time Frame: 4 weeks  1. Report no more than 2-3/10 intermittent pain to R shoulder with compensatory use during basic functional activities, and score less than 60% on the DASH. Ongoing, progressing 1-8-19  2. R shoulder PROM forward elevation greater than 160 degrees and external rotation greater than 90 degrees to progress into functional ranges. Ongoing, progressing 1-8-19  3. Demonstrate good R shoulder isometric strength with manual testing to progress into strength phase. MET 1-8-19  4. Independent with initial HEP. Discharge Goals: Time Frame: 8 weeks  1. No more than 2-3/10 intermittent pain R shoulder with return to normalized household and work activities, and score less than 20% on the DASH. 2. R shoulder AROM forward elevation greater than 160 degrees, active external rotation greater than T1-2 with functional reaching, and strength to shoulder are grossly WNL's for safe use with normalized activities. 3. Demonstrate good functional shoulder strength and endurance for return to normalized household and work activities. 4. Independent with advanced shoulder HEP for continued self-management. Rehabilitation Potential For Stated Goals: GOOD                The information in this section was collected on 12-13-18 (except where otherwise noted). HISTORY:   History of Present Injury/Illness (Reason for Referral): Pt reports that she injured her R shoulder while at work on March 6, 2018 when she lifted a box and felt sudden pain in her R shoulder. Attended 12 visits of physical therapy with no relief, and underwent surgery on 12/6/18. Past Medical History/Comorbidities: Ms. Dawit Molina  has a past medical history of Asthma, BMI 50.0-59.9, adult ([address] and Former smoker.  She also has no past medical history of Adverse effect of anesthesia, Aneurysm ([address] Arrhythmia, Arthritis, Autoimmune disease ([address] CAD (coronary artery disease), Cancer ([address] Chronic kidney disease, Chronic obstructive pulmonary disease ([address] Chronic pain, Coagulation disorder ([address] Diabetes ([address] Difficult intubation, Endocarditis, GERD (gastroesophageal reflux disease), Heart failure ([address] Hypertension, Ill-defined condition, Liver disease, Malignant hyperthermia due to anesthesia, Nausea & vomiting, Nicotine vapor product user, Non-nicotine vapor product user, Pseudocholinesterase deficiency, Psychiatric disorder, PUD (peptic ulcer disease), Rheumatic fever, Seizures ([address] Sleep apnea, Stroke ([address] Thromboembolus ([address] or Thyroid disease. Ms. Noman Michael  has a past surgical history that includes hx dilation and curettage (2017). Social History/Living Environment:  Patient lives alone in a 1-story house usually, but is staying with her mother and siblings in a two-story house while recovering from this surgery. Prior Level of Function/Work/Activity: Patient previously worked full-time for [employer] at [employer]. 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 Dominant Side:         RIGHT  Current Medications:     -Promethazine 25 mg - nausea  - Ketorolac 10 MG - pain  - Hydromorphone 2 MG - pain  - Temazepam 15 MG - sleeping  - Lyrica - but has not been usin  - Takes an anti-inflammatory and a cream for pain and stiffness  - Received prescription for Tramadol and ambien at last PT appointmemnt   Date Last Reviewed:  2-11-19   EXAMINATION:   1-8-19  Observation/Orthostatic Postural Assessment:  Pt arrived to PT in no obvious distress. Palpation:  Tenderness to R anterior deltoid.      ROM:                  R shoulder AROM (standing) (2-5-19)   Flexion: 150 degrees   ER (elbow at side) 70 degrees   ABD: 142 deg   IR (reaching behind back): L4-5              Strength:         R shoulder  (1-8-19)   flexion 4+/5   ABD 4+/5   ER (elbow at side): 4/5   IR (elbow at side): 5/5         Special Tests: None  Neurological Screen: Motor and sensory to L UE intact. Functional Mobility:  Sit to/from Stand: independent. Bed Mobility: independent. Independent with basic mobility. Balance:  Not tested        Body Structures Involved:  1. Nerves  2. Bones  3. Joints  4. Muscles Body Functions Affected:  1. Sensory/Pain  2. Neuromusculoskeletal  3. Movement Related  4. Skin Related Activities and Participation Affected:  1. Self Care  2. Interpersonal Interactions and Relationships  3. Community, Social and Civic Life   CLINICAL PRESENTATION:   CLINICAL DECISION MAKING:   Outcome Measure: Tool Used: Disabilities of the Arm, Shoulder and Hand (DASH) Questionnaire - Quick Version  Score:  Initial: 53/55 or 95% limited (12-13-18)  45/55 or 77% limited (Date: 1-8-19 ) Most recent: 44/55 or 75% limited (2-5-19)   Interpretation of Score: The DASH is designed to measure the activities of daily living in person's with upper extremity dysfunction or pain. Each section is scored on a 1-5 scale, 5 representing the greatest disability. The scores of each section are added together for a total score of 55. This number is divided by 11, followed by subtracting 1 and multiplying by 25 to get a percent score of disability. This value represents the percentage disability: 0-20% minimal disability; 20-40% moderate disability; 40-60% severe disability; % dependent for care or exaggerated symptom behavior. Minimal detectable change is 12%. Score 11 12-19 20-28 29-37 38-45 46-54 55   Modifier CH CI CJ CK CL CM CN     Medical Necessity:   · Skilled intervention continues to be required due to R shoulder pain s/p R shoulder arthroscopy. Reason for Services/Other Comments:  · Patient continues to require skilled intervention due to R shoulder pain s/p R shoulder arthroscopy.             TREATMENT:   (In addition to Assessment/Re-Assessment sessions the following treatments were rendered)  Pre-treatment Symptoms/Complaints: States that Dr Joe Montilla ordered more PT and gave her two prescriptions. She has not used the tramodol as it makes her nauseated. Pain: Initial:   8/10 Post Session:  No pain value provided after PT       Manual Therapy (0 minutes)     Therapeutic Exercise ( 40 minutes) to improve R upper extremity function. Cues required and provided when necessary. Passive ranging to R shoulder performed prior to strengthening for reduced muscle guarding and discomfort, for forward elevation, ER and IR in supine with movements performed in scapular plane. 5 min warm-up on upper body ergometer to begin PT.   .   Date  1-4-18 Date  1-8-19 Date  1-14-19 Date  1-16-19 Date  1-23-19 Date  1-24-19 Date  1-29-19 Date  2-5-19 Date  2-11-19   Activity/Exercise            Flexion Standing, to 90 3 x 6  Active, through available ROM x 10  1# 2 x 10  2# 3 x 10   2# 3 x 10      2# 3 x 10  2# 3 x 10    Supine press 1# 3 x 10 1# x 10  2# 3 x 10 2# x 10  3# 2 x 10 2# x 10  3# 2 x 10  2# x 10  4# 3 x 10  3# 3 x 10 3# x 12, x 8, x 8 - 3# 3 x 10    External rotations 3# 3 x 10 3# 3 x 10 4# 3 x 10  Side-lying  3# 3 x 10     Green, 3 x 10 single band Side-lying  3# 3 x 10     Green, 3 x 10 single     Side-lying  3 x 12 0#     External rotations isometric in side-lying 3 x 10 Side-lying  3# 3 x 10     Single band, green 3 x 10  Single band, green 3 x 10  Side-lying   3# 3 x 10        Abduction Side-lying  2# 3 x 10  Side-lying  4# 3 x 10     Scaption  0# 3 x 10  Scaption  1# 2 x 10 Scaption  2# 3 x 10   Side-lying  3# 3 x 10 Standing   2# 3 x 10  Standing, 2# 3 x 10   Prone rows  5# 3 x 10 6# 3 x 10 Prone extensions  0# 2 x 10  Prone   5# 3 x 10 R Prone  6# 3 x 10 R Bentover row  7# 3 x 8 R Bentover row  7# 3 x 10 R   Scapular retractions Blue, elbows ext  3 x 12 B     Cable, unilateral  R 7# 3 x 8 7# 3 x 10  7# cable row, unilateral  3 x 10  10# 3 x 10 unilateral, R    Band pull aparts, red 2 x 10    Black, 3 x 15 B Black, elbows flexed 3 x 10 B Cable row, 10# 2 x 10 R     AAROM UE [place] 3 x 10 UE [place] 3 x 10  UE [place] 1 x 10     Wall slides 2 x 10 in forearm neutral Wall slides in forearm neutral 2 x 12 R    UE [place] 2 x 12  Wall slides in forearm neutral 2 x 12 R    UE [place] 2 x 12 UE [place] 3 x 10 UE [place] 3 x 10    Wall slides in forearm neutral, 3 x 5  Wall slides in forearm neutral 3 x 8    UE [place] 3 x10  UE [place] 3 x 10     Wall slides, forearm neutral 3 x 8   Mid-trap  Prone  2# 3 x 10 Prone  2# 3 x 10 Prone  2# 3 x 10 Prone  0# 2 x 12 Prone  0# x 10  1# 2 x 10 Prone  0# 2 x 10 Side-lying isometrics  3 x 6   Prone  0# 3 x 10   Lower trap Prone  0# 3 x 10 Prone  1# 1 x 10  0# 1 x 10 Prone  0# 3 x 10 Prone  0# 2 x 12 1# 3 x 10  Prone  0# 2 x 10  Side-lying isometrics   3 x 5  Prone  0# 3 x 10    Bicep curls    5# 2 x 15                            HEP: none added today. Treatment/Session Assessment:    · Response to Treatment:  Demonstrates improving strength and range of motion. Remains limited during performance of wall slides due to discomfort with forward elevation. · Compliance with Program/Exercises: Compliant  · Recommendations/Intent for next treatment session: \"Next visit will focus on improving strength and function of R UE\".    Total Treatment Duration: 40 Minutes  PT Patient Time In/Time Out  Time In: 9837  Time Out: Alfonso Anderson, PT

## 2019-02-11 NOTE — THERAPY RECERTIFICATION
[employer]  : 1990  Payor: [address] / Plan: Pepper Null / Product Type: Workers Comp /  [address]  at [address] [place] [address] Rd  [address] [facility],  Tila Thacker.  [phone]   [phone]       OUTPATIENT PHYSICAL [address]      ICD-10: Treatment Diagnosis: Pain in right shoulder (M25.511), Stiffness of right shoulder, not elsewhere classified (M25.611)  Precautions/Allergies:   Patient has no known allergies. Fall Risk Score: 0 (? 5 = High Risk)  MD Orders: Evaluate and treat, strengthening, range of motion, home exercise program, \"\"full motion, full strength, no restrictions\"  MEDICAL/REFERRING DIAGNOSIS:  S/P RT Shoulder Arthroscopy, Arthroscopic distal clavicle resection   DATE OF ONSET: 18 (surgery), 3/6/18 (injury)  REFERRING PHYSICIAN: Bianca Steele MD  RETURN PHYSICIAN APPOINTMENT: 19 ASSESSMENT:  Ms. Brayden Mason 1 month s/p R shoulder arthroscopy with distal clavicle resection and debridement of glenohumeral joint. Patient exhibits improved R shoulder AROM in all directions and improving strength, but remains limited by discomfort. Pt is a good candidate for continued therapy to improve these deficits and facilitate improved strength and AROM in order to restore full function to R UE. PROBLEM LIST (Impacting functional limitations):  1. Decreased [place] with Home Exercise Program  2. Post-op R shoulder pain and swelling  3. Decreased R shoulder ROM   4. Weakness R shoulder INTERVENTIONS PLANNED:  1. Thermal and electric modalities, manual therapies for pain   2. Manual therapies, therapeutic exercises, HEP for ROM    3. Therapeutic exercises and HEP for strength   TREATMENT PLAN:  Effective Dates: 2019 TO 2019.  Frequency/Duration: 2 visits per week for 8 weeks (in anticipation of additional visits ordered by physician at follow up appointments)  GOALS: (Goals have been discussed and agreed upon with patient.)  Short-Term Functional Goals: Time Frame: 4 weeks  1. Report no more than 2-3/10 intermittent pain to R shoulder with compensatory use during basic functional activities, and score less than 60% on the DASH. Ongoing, progressing 1-8-19  2. R shoulder PROM forward elevation greater than 160 degrees and external rotation greater than 90 degrees to progress into functional ranges. Ongoing, progressing 1-8-19  3. Demonstrate good R shoulder isometric strength with manual testing to progress into strength phase. MET 1-8-19  4. Independent with initial HEP. Discharge Goals: Time Frame: 8 weeks  1. No more than 2-3/10 intermittent pain R shoulder with return to normalized household and work activities, and score less than 20% on the DASH. 2. R shoulder AROM forward elevation greater than 160 degrees, active external rotation greater than T1-2 with functional reaching, and strength to shoulder are grossly WNL's for safe use with normalized activities. 3. Demonstrate good functional shoulder strength and endurance for return to normalized household and work activities. 4. Independent with advanced shoulder HEP for continued self-management. Rehabilitation Potential For Stated Goals: GOOD    Regarding Antonia Love's therapy, I certify that the treatment plan above will be carried out by a therapist or under their direction. Thank you for this referral,      Zay De Dios PT       Referring Physician Signature:     Rebekah Melvin MD          Date                          The information in this section was collected on 12-13-18 (except where otherwise noted). HISTORY:   History of Present Injury/Illness (Reason for Referral): Pt reports that she injured her R shoulder while at work on March 6, 2018 when she lifted a box and felt sudden pain in her R shoulder. Attended 12 visits of physical therapy with no relief, and underwent surgery on 12/6/18.   Past Medical History/Comorbidities: Ms. Rianna Bergeron  has a past medical history of Asthma, BMI 50.0-59.9, adult ([address] and Former smoker. She also has no past medical history of Adverse effect of anesthesia, Aneurysm ([address] Arrhythmia, Arthritis, Autoimmune disease ([address] CAD (coronary artery disease), Cancer ([address] Chronic kidney disease, Chronic obstructive pulmonary disease ([address] Chronic pain, Coagulation disorder ([address] Diabetes ([address] Difficult intubation, Endocarditis, GERD (gastroesophageal reflux disease), Heart failure ([address] Hypertension, Ill-defined condition, Liver disease, Malignant hyperthermia due to anesthesia, Nausea & vomiting, Nicotine vapor product user, Non-nicotine vapor product user, Pseudocholinesterase deficiency, Psychiatric disorder, PUD (peptic ulcer disease), Rheumatic fever, Seizures ([address] Sleep apnea, Stroke ([address] Thromboembolus ([address] or Thyroid disease. Ms. Rianna Bergeron  has a past surgical history that includes hx dilation and curettage (2017). Social History/Living Environment:  Patient lives alone in a 1-story house usually, but is staying with her mother and siblings in a two-story house while recovering from this surgery. Prior Level of Function/Work/Activity: Patient previously worked full-time for [employer] at [employer]. 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 Dominant Side:         RIGHT  Current Medications:     -Promethazine 25 mg - nausea  - Ketorolac 10 MG - pain  - Hydromorphone 2 MG - pain  - Temazepam 15 MG - sleeping  - Lyrica - but has not been usin  - Takes an anti-inflammatory and a cream for pain and stiffness   Date Last Reviewed:  1-14-19   EXAMINATION:   1-8-19  Observation/Orthostatic Postural Assessment:  Pt arrived to PT in no obvious distress. Palpation:  Tenderness to R anterior deltoid.      ROM:                  R shoulder AROM (standing) (2-5-19)   Flexion: 150 degrees   ER (elbow at side) 70 degrees   ABD: 142 deg   IR (reaching behind back): L4-5 Strength:         R shoulder  (1-8-19)   flexion 4+/5   ABD 4+/5   ER (elbow at side): 4/5   IR (elbow at side): 5/5         Special Tests: None  Neurological Screen: Motor and sensory to L UE intact. Functional Mobility:  Sit to/from Stand: independent. Bed Mobility: independent. Independent with basic mobility. Balance:  Not tested        Body Structures Involved:  1. Nerves  2. Bones  3. Joints  4. Muscles Body Functions Affected:  1. Sensory/Pain  2. Neuromusculoskeletal  3. Movement Related  4. Skin Related Activities and Participation Affected:  1. Self Care  2. Interpersonal Interactions and Relationships  3. Community, Social and Civic Life   CLINICAL PRESENTATION:   CLINICAL DECISION MAKING:   Outcome Measure: Tool Used: Disabilities of the Arm, Shoulder and Hand (DASH) Questionnaire - Quick Version  Score:  Initial: 53/55 or 95% limited (12-13-18)  45/55 or 77% limited (Date: 1-8-19 ) Most recent: 44/55 or 75% limited (2-5-19)   Interpretation of Score: The DASH is designed to measure the activities of daily living in person's with upper extremity dysfunction or pain. Each section is scored on a 1-5 scale, 5 representing the greatest disability. The scores of each section are added together for a total score of 55. This number is divided by 11, followed by subtracting 1 and multiplying by 25 to get a percent score of disability. This value represents the percentage disability: 0-20% minimal disability; 20-40% moderate disability; 40-60% severe disability; % dependent for care or exaggerated symptom behavior. Minimal detectable change is 12%. Score 11 12-19 20-28 29-37 38-45 46-54 55   Modifier CH CI CJ CK CL CM CN     Medical Necessity:   · Skilled intervention continues to be required due to R shoulder pain s/p R shoulder arthroscopy.   Reason for Services/Other Comments:  · Patient continues to require skilled intervention due to R shoulder pain s/p R shoulder arthroscopy.

## 2019-02-12 NOTE — PROGRESS NOTES
[employer]  : 1990  Payor: [address] [place] [address] Road / Plan: Clifford Gonzales / Product Type: Workers Comp /  [address]  at [facility]  [address] [place] [address], 4 Tila Thacker.  [phone]   [phone]       OUTPATIENT PHYSICAL [address] Note 2019     ICD-10: Treatment Diagnosis: Pain in right shoulder (M25.511), Stiffness of right shoulder, not elsewhere classified (M25.611)  Precautions/Allergies:   Patient has no known allergies. Fall Risk Score: 0 (? 5 = High Risk)  MD Orders: Evaluate and treat, strengthening, range of motion, home exercise program, \"\"full motion, full strength, no restrictions\"  MEDICAL/REFERRING DIAGNOSIS:  S/P RT Shoulder Arthroscopy, Arthroscopic distal clavicle resection   DATE OF ONSET: 18 (surgery), 3/6/18 (injury)  REFERRING PHYSICIAN: Jeremy Mena MD  RETURN PHYSICIAN APPOINTMENT: 3/5/19     1-8-19 ASSESSMENT:  Ms. Jean-Pierre Salinas 1 month s/p R shoulder arthroscopy with distal clavicle resection and debridement of glenohumeral joint. Patient exhibits improved R shoulder AROM in all directions and improving strength, but remains limited by discomfort. Pt is a good candidate for continued therapy to improve these deficits and facilitate improved strength and AROM in order to restore full function to R UE. PROBLEM LIST (Impacting functional limitations):  1. Decreased [place] with Home Exercise Program  2. Post-op R shoulder pain and swelling  3. Decreased R shoulder ROM   4. Weakness R shoulder INTERVENTIONS PLANNED:  1. Thermal and electric modalities, manual therapies for pain   2. Manual therapies, therapeutic exercises, HEP for ROM    3. Therapeutic exercises and HEP for strength   TREATMENT PLAN:  Effective Dates: 2019 TO 2019.  Frequency/Duration: 2 visits per week for 8 weeks (in anticipation of additional visits ordered by physician at follow up appointments)  GOALS: (Goals have been discussed and agreed upon with patient.)  Short-Term Functional Goals: Time Frame: 4 weeks  1. Report no more than 2-3/10 intermittent pain to R shoulder with compensatory use during basic functional activities, and score less than 60% on the DASH. Ongoing, progressing 1-8-19  2. R shoulder PROM forward elevation greater than 160 degrees and external rotation greater than 90 degrees to progress into functional ranges. Ongoing, progressing 1-8-19  3. Demonstrate good R shoulder isometric strength with manual testing to progress into strength phase. MET 1-8-19  4. Independent with initial HEP. Discharge Goals: Time Frame: 8 weeks  1. No more than 2-3/10 intermittent pain R shoulder with return to normalized household and work activities, and score less than 20% on the DASH. 2. R shoulder AROM forward elevation greater than 160 degrees, active external rotation greater than T1-2 with functional reaching, and strength to shoulder are grossly WNL's for safe use with normalized activities. 3. Demonstrate good functional shoulder strength and endurance for return to normalized household and work activities. 4. Independent with advanced shoulder HEP for continued self-management. Rehabilitation Potential For Stated Goals: GOOD                The information in this section was collected on 12-13-18 (except where otherwise noted). HISTORY:   History of Present Injury/Illness (Reason for Referral): Pt reports that she injured her R shoulder while at work on March 6, 2018 when she lifted a box and felt sudden pain in her R shoulder. Attended 12 visits of physical therapy with no relief, and underwent surgery on 12/6/18. Past Medical History/Comorbidities: Ms. Vick Diaz  has a past medical history of Asthma, BMI 50.0-59.9, adult ([address] and Former smoker.  She also has no past medical history of Adverse effect of anesthesia, Aneurysm ([address] Arrhythmia, Arthritis, Autoimmune disease ([address] CAD (coronary artery disease), Cancer ([address] Chronic kidney disease, Chronic obstructive pulmonary disease ([address] Chronic pain, Coagulation disorder ([address] Diabetes ([address] Difficult intubation, Endocarditis, GERD (gastroesophageal reflux disease), Heart failure ([address] Hypertension, Ill-defined condition, Liver disease, Malignant hyperthermia due to anesthesia, Nausea & vomiting, Nicotine vapor product user, Non-nicotine vapor product user, Pseudocholinesterase deficiency, Psychiatric disorder, PUD (peptic ulcer disease), Rheumatic fever, Seizures ([address] Sleep apnea, Stroke ([address] Thromboembolus ([address] or Thyroid disease. Ms. Bo Bradley  has a past surgical history that includes hx dilation and curettage (2017). Social History/Living Environment:  Patient lives alone in a 1-story house usually, but is staying with her mother and siblings in a two-story house while recovering from this surgery. Prior Level of Function/Work/Activity: Patient previously worked full-time for [employer] at [employer]. 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 Dominant Side:         RIGHT  Current Medications:     -Promethazine 25 mg - nausea  - Ketorolac 10 MG - pain  - Hydromorphone 2 MG - pain  - Temazepam 15 MG - sleeping  - Lyrica - but has not been usin  - Takes an anti-inflammatory and a cream for pain and stiffness  - Received prescription for Tramadol and ambien at last PT appointmemnt   Date Last Reviewed:  2-11-19   EXAMINATION:   1-8-19  Observation/Orthostatic Postural Assessment:  Pt arrived to PT in no obvious distress. Palpation:  Tenderness to R anterior deltoid.      ROM:                  R shoulder AROM (standing) (2-5-19)   Flexion: 150 degrees   ER (elbow at side) 70 degrees   ABD: 142 deg   IR (reaching behind back): L4-5              Strength:         R shoulder  (1-8-19)   flexion 4+/5   ABD 4+/5   ER (elbow at side): 4/5   IR (elbow at side): 5/5         Special Tests: None  Neurological Screen: Motor and sensory to L UE intact. Functional Mobility:  Sit to/from Stand: independent. Bed Mobility: independent. Independent with basic mobility. Balance:  Not tested        Body Structures Involved:  1. Nerves  2. Bones  3. Joints  4. Muscles Body Functions Affected:  1. Sensory/Pain  2. Neuromusculoskeletal  3. Movement Related  4. Skin Related Activities and Participation Affected:  1. Self Care  2. Interpersonal Interactions and Relationships  3. Community, Social and Civic Life   CLINICAL PRESENTATION:   CLINICAL DECISION MAKING:   Outcome Measure: Tool Used: Disabilities of the Arm, Shoulder and Hand (DASH) Questionnaire - Quick Version  Score:  Initial: 53/55 or 95% limited (12-13-18)  45/55 or 77% limited (Date: 1-8-19 ) Most recent: 44/55 or 75% limited (2-5-19)   Interpretation of Score: The DASH is designed to measure the activities of daily living in person's with upper extremity dysfunction or pain. Each section is scored on a 1-5 scale, 5 representing the greatest disability. The scores of each section are added together for a total score of 55. This number is divided by 11, followed by subtracting 1 and multiplying by 25 to get a percent score of disability. This value represents the percentage disability: 0-20% minimal disability; 20-40% moderate disability; 40-60% severe disability; % dependent for care or exaggerated symptom behavior. Minimal detectable change is 12%. Score 11 12-19 20-28 29-37 38-45 46-54 55   Modifier CH CI CJ CK CL CM CN     Medical Necessity:   · Skilled intervention continues to be required due to R shoulder pain s/p R shoulder arthroscopy. Reason for Services/Other Comments:  · Patient continues to require skilled intervention due to R shoulder pain s/p R shoulder arthroscopy.             TREATMENT:   (In addition to Assessment/Re-Assessment sessions the following treatments were rendered)  Pre-treatment Symptoms/Complaints: States that Dr Verner Redwood ordered more PT and gave her two prescriptions. She has not used the tramodol as it makes her nauseated. Pain: Initial:   8/10 Post Session:  No VAS after PT       Manual Therapy (0 minutes)     Therapeutic Exercise ( 40 minutes) to improve R upper extremity function. Cues required and provided when necessary. Passive ranging to R shoulder performed prior to strengthening for reduced muscle guarding and discomfort, for forward elevation, ER and IR in supine with movements performed in scapular plane. 5 min warm-up on upper body ergometer to begin PT.   .   Date  1-4-18 Date  1-8-19 Date  1-14-19 Date  1-16-19 Date  1-23-19 Date  1-24-19 Date  1-29-19 Date  2-5-19 Date  2-11-19   Activity/Exercise            Flexion Standing, to 90 3 x 6  Active, through available ROM x 10  1# 2 x 10  2# 3 x 10   2# 3 x 10      2# 3 x 10  2# 3 x 10    Supine press 1# 3 x 10 1# x 10  2# 3 x 10 2# x 10  3# 2 x 10 2# x 10  3# 2 x 10  2# x 10  4# 3 x 10  3# 3 x 10 3# x 12, x 8, x 8 - 3# 3 x 10    External rotations 3# 3 x 10 3# 3 x 10 4# 3 x 10  Side-lying  3# 3 x 10     Green, 3 x 10 single band Side-lying  3# 3 x 10     Green, 3 x 10 single     Side-lying  3 x 12 0#     External rotations isometric in side-lying 3 x 10 Side-lying  3# 3 x 10     Single band, green 3 x 10  Single band, green 3 x 10  Side-lying   3# 3 x 10        Abduction Side-lying  2# 3 x 10  Side-lying  4# 3 x 10     Scaption  0# 3 x 10  Scaption  1# 2 x 10 Scaption  2# 3 x 10   Side-lying  3# 3 x 10 Standing   2# 3 x 10  Standing, 2# 3 x 10   Prone rows  5# 3 x 10 6# 3 x 10 Prone extensions  0# 2 x 10  Prone   5# 3 x 10 R Prone  6# 3 x 10 R Bentover row  7# 3 x 8 R Bentover row  7# 3 x 10 R   Scapular retractions Blue, elbows ext  3 x 12 B     Cable, unilateral  R 7# 3 x 8 7# 3 x 10  7# cable row, unilateral  3 x 10  10# 3 x 10 unilateral, R    Band pull aparts, red 2 x 10    Black, 3 x 15 B Black, elbows flexed 3 x 10 B Cable row, 10# 2 x 10 R     AAROM UE [place] 3 x 10 UE [place] 3 x 10  UE [place] 1 x 10     Wall slides 2 x 10 in forearm neutral Wall slides in forearm neutral 2 x 12 R    UE [place] 2 x 12  Wall slides in forearm neutral 2 x 12 R    UE [place] 2 x 12 UE [place] 3 x 10 UE [place] 3 x 10    Wall slides in forearm neutral, 3 x 5  Wall slides in forearm neutral 3 x 8    UE [place] 3 x10  UE [place] 3 x 10     Wall slides, forearm neutral 3 x 8   Mid-trap  Prone  2# 3 x 10 Prone  2# 3 x 10 Prone  2# 3 x 10 Prone  0# 2 x 12 Prone  0# x 10  1# 2 x 10 Prone  0# 2 x 10 Side-lying isometrics  3 x 6   Prone  0# 3 x 10   Lower trap Prone  0# 3 x 10 Prone  1# 1 x 10  0# 1 x 10 Prone  0# 3 x 10 Prone  0# 2 x 12 1# 3 x 10  Prone  0# 2 x 10  Side-lying isometrics   3 x 5  Prone  0# 3 x 10    Bicep curls    5# 2 x 15                            HEP: none added today. Treatment/Session Assessment:    · Response to Treatment:  Demonstrates improving strength and range of motion. Remains limited during performance of wall slides due to discomfort with forward elevation. · Compliance with Program/Exercises: Compliant  · Recommendations/Intent for next treatment session: \"Next visit will focus on improving strength and function of R UE\".    Total Treatment Duration: 40 Minutes  PT Patient Time In/Time Out  Time In: 0935  Time Out: Alfonso Anderson, KERRI

## 2019-02-13 ENCOUNTER — HOSPITAL ENCOUNTER (OUTPATIENT)
Dept: PHYSICAL THERAPY | Age: 29
Discharge: HOME | End: 2019-02-13
Payer: COMMERCIAL

## 2019-02-13 PROCEDURE — 97110 THERAPEUTIC EXERCISES: CPT

## 2019-02-13 NOTE — PROGRESS NOTES
[employer]  : 1990  Payor: [address] [place] [address] / Plan: Ephraim Del Cid / Product Type: Workers Comp /  [address]  at [address] [place] [address] Rd  [address] [facility],  Tila Thacker.  [phone]   [phone]       OUTPATIENT PHYSICAL [address] Note 2019     ICD-10: Treatment Diagnosis: Pain in right shoulder (M25.511), Stiffness of right shoulder, not elsewhere classified (M25.611)  Precautions/Allergies:   Patient has no known allergies. Fall Risk Score: 0 (? 5 = High Risk)  MD Orders: Evaluate and treat, strengthening, range of motion, home exercise program, \"\"full motion, full strength, no restrictions\"  MEDICAL/REFERRING DIAGNOSIS:  S/P RT Shoulder Arthroscopy, Arthroscopic distal clavicle resection   DATE OF ONSET: 18 (surgery), 3/6/18 (injury)  REFERRING PHYSICIAN: Lisa Reynolds MD  RETURN PHYSICIAN APPOINTMENT: 3/5/19     1-8-19 ASSESSMENT:  Ms. Lieutenant Nguyen 1 month s/p R shoulder arthroscopy with distal clavicle resection and debridement of glenohumeral joint. Patient exhibits improved R shoulder AROM in all directions and improving strength, but remains limited by discomfort. Pt is a good candidate for continued therapy to improve these deficits and facilitate improved strength and AROM in order to restore full function to R UE. PROBLEM LIST (Impacting functional limitations):  1. Decreased [place] with Home Exercise Program  2. Post-op R shoulder pain and swelling  3. Decreased R shoulder ROM   4. Weakness R shoulder INTERVENTIONS PLANNED:  1. Thermal and electric modalities, manual therapies for pain   2. Manual therapies, therapeutic exercises, HEP for ROM    3. Therapeutic exercises and HEP for strength   TREATMENT PLAN:  Effective Dates: 2019 TO 2019.  Frequency/Duration: 2 visits per week for 8 weeks (in anticipation of additional visits ordered by physician at follow up appointments)  GOALS: (Goals have been discussed and agreed upon with patient.)  Short-Term Functional Goals: Time Frame: 4 weeks  1. Report no more than 2-3/10 intermittent pain to R shoulder with compensatory use during basic functional activities, and score less than 60% on the DASH. Ongoing, progressing 1-8-19  2. R shoulder PROM forward elevation greater than 160 degrees and external rotation greater than 90 degrees to progress into functional ranges. Ongoing, progressing 1-8-19  3. Demonstrate good R shoulder isometric strength with manual testing to progress into strength phase. MET 1-8-19  4. Independent with initial HEP. Discharge Goals: Time Frame: 8 weeks  1. No more than 2-3/10 intermittent pain R shoulder with return to normalized household and work activities, and score less than 20% on the DASH. 2. R shoulder AROM forward elevation greater than 160 degrees, active external rotation greater than T1-2 with functional reaching, and strength to shoulder are grossly WNL's for safe use with normalized activities. 3. Demonstrate good functional shoulder strength and endurance for return to normalized household and work activities. 4. Independent with advanced shoulder HEP for continued self-management. Rehabilitation Potential For Stated Goals: GOOD                The information in this section was collected on 12-13-18 (except where otherwise noted). HISTORY:   History of Present Injury/Illness (Reason for Referral): Pt reports that she injured her R shoulder while at work on March 6, 2018 when she lifted a box and felt sudden pain in her R shoulder. Attended 12 visits of physical therapy with no relief, and underwent surgery on 12/6/18. Past Medical History/Comorbidities: Ms. Susi Krueger  has a past medical history of Asthma, BMI 50.0-59.9, adult ([address] and Former smoker.  She also has no past medical history of Adverse effect of anesthesia, Aneurysm ([address] Arrhythmia, Arthritis, Autoimmune disease ([address] CAD (coronary artery disease), Cancer ([address] Chronic kidney disease, Chronic obstructive pulmonary disease ([address] Chronic pain, Coagulation disorder ([address] Diabetes ([address] Difficult intubation, Endocarditis, GERD (gastroesophageal reflux disease), Heart failure ([address] Hypertension, Ill-defined condition, Liver disease, Malignant hyperthermia due to anesthesia, Nausea & vomiting, Nicotine vapor product user, Non-nicotine vapor product user, Pseudocholinesterase deficiency, Psychiatric disorder, PUD (peptic ulcer disease), Rheumatic fever, Seizures ([address] Sleep apnea, Stroke ([address] Thromboembolus ([address] or Thyroid disease. Ms. Cassidy Park  has a past surgical history that includes hx dilation and curettage (2017). Social History/Living Environment:  Patient lives alone in a 1-story house usually, but is staying with her mother and siblings in a two-story house while recovering from this surgery. Prior Level of Function/Work/Activity: Patient previously worked full-time for [employer] at [employer]. 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 Dominant Side:         RIGHT  Current Medications:     -Promethazine 25 mg - nausea  - Ketorolac 10 MG - pain  - Hydromorphone 2 MG - pain  - Temazepam 15 MG - sleeping  - Lyrica - but has not been usin  - Takes an anti-inflammatory and a cream for pain and stiffness  - Received prescription for Tramadol and ambien at last PT appointmemnt   Date Last Reviewed:  2-11-19   EXAMINATION:   1-8-19  Observation/Orthostatic Postural Assessment:  Pt arrived to PT in no obvious distress. Palpation:  Tenderness to R anterior deltoid.      ROM:                  R shoulder AROM (standing) (2-5-19)   Flexion: 150 degrees   ER (elbow at side) 70 degrees   ABD: 142 deg   IR (reaching behind back): L4-5              Strength:         R shoulder  (1-8-19)   flexion 4+/5   ABD 4+/5   ER (elbow at side): 4/5   IR (elbow at side): 5/5         Special Tests: None  Neurological Screen: Motor and sensory to L UE intact. Functional Mobility:  Sit to/from Stand: independent. Bed Mobility: independent. Independent with basic mobility. Balance:  Not tested        Body Structures Involved:  1. Nerves  2. Bones  3. Joints  4. Muscles Body Functions Affected:  1. Sensory/Pain  2. Neuromusculoskeletal  3. Movement Related  4. Skin Related Activities and Participation Affected:  1. Self Care  2. Interpersonal Interactions and Relationships  3. Community, Social and Civic Life   CLINICAL PRESENTATION:   CLINICAL DECISION MAKING:   Outcome Measure: Tool Used: Disabilities of the Arm, Shoulder and Hand (DASH) Questionnaire - Quick Version  Score:  Initial: 53/55 or 95% limited (12-13-18)  45/55 or 77% limited (Date: 1-8-19 ) Most recent: 44/55 or 75% limited (2-5-19)   Interpretation of Score: The DASH is designed to measure the activities of daily living in person's with upper extremity dysfunction or pain. Each section is scored on a 1-5 scale, 5 representing the greatest disability. The scores of each section are added together for a total score of 55. This number is divided by 11, followed by subtracting 1 and multiplying by 25 to get a percent score of disability. This value represents the percentage disability: 0-20% minimal disability; 20-40% moderate disability; 40-60% severe disability; % dependent for care or exaggerated symptom behavior. Minimal detectable change is 12%. Score 11 12-19 20-28 29-37 38-45 46-54 55   Modifier CH CI CJ CK CL CM CN     Medical Necessity:   · Skilled intervention continues to be required due to R shoulder pain s/p R shoulder arthroscopy. Reason for Services/Other Comments:  · Patient continues to require skilled intervention due to R shoulder pain s/p R shoulder arthroscopy.             TREATMENT:   (In addition to Assessment/Re-Assessment sessions the following treatments were rendered)  Pre-treatment Symptoms/Complaints: No new complaints regarding R shoulder symptoms. Pain: Initial:   No VAS upon arrival  Post Session:  No VAS after PT       Manual Therapy (0 minutes)     Therapeutic Exercise ( 40 minutes) to improve R upper extremity function. Cues required and provided when necessary. 5 min warm-up on upper body ergometer to begin PT. Tactile and verbal cues required as needed to demonstrate appropriate exercise technique.   .   Date  1-4-18 Date  1-8-19 Date  1-14-19 Date  1-16-19 Date  1-23-19 Date  1-24-19 Date  1-29-19 Date  2-5-19 Date  2-11-19 Date  2-13-19   Activity/Exercise             Flexion Standing, to 90 3 x 6  Active, through available ROM x 10  1# 2 x 10  2# 3 x 10   2# 3 x 10      2# 3 x 10  2# 3 x 10  2# 3 x 10   Supine press 1# 3 x 10 1# x 10  2# 3 x 10 2# x 10  3# 2 x 10 2# x 10  3# 2 x 10  2# x 10  4# 3 x 10  3# 3 x 10 3# x 12, x 8, x 8 - 3# 3 x 10  3# 3 x 10   External rotations 3# 3 x 10 3# 3 x 10 4# 3 x 10  Side-lying  3# 3 x 10     Green, 3 x 10 single band Side-lying  3# 3 x 10     Green, 3 x 10 single     Side-lying  3 x 12 0#     External rotations isometric in side-lying 3 x 10 Side-lying  3# 3 x 10     Single band, green 3 x 10  Single band, green 3 x 10  Side-lying   3# 3 x 10      Side-lying  3# 3 x 10    Abduction Side-lying  2# 3 x 10  Side-lying  4# 3 x 10     Scaption  0# 3 x 10  Scaption  1# 2 x 10 Scaption  2# 3 x 10   Side-lying  3# 3 x 10 Standing   2# 3 x 10  Standing, 2# 3 x 10 Standing, 3# 3 x 10    Prone rows  5# 3 x 10 6# 3 x 10 Prone extensions  0# 2 x 10  Prone   5# 3 x 10 R Prone  6# 3 x 10 R Bentover row  7# 3 x 8 R [place]over row  7# 3 x 10 R    Scapular retractions Blue, elbows ext  3 x 12 B     Cable, unilateral  R 7# 3 x 8 7# 3 x 10  7# cable row, unilateral  3 x 10  10# 3 x 10 unilateral, R    Band pull aparts, red 2 x 10    Black, 3 x 15 B Black, elbows flexed 3 x 10 B Cable row, 10# 2 x 10 R   Cable row  10# 3 x 10    AAROM UE [place] 3 x 10 UE [place] 3 x 10  UE [place] 1 x 10     Wall slides 2 x 10 in forearm neutral Wall slides in forearm neutral 2 x 12 R    UE [place] 2 x 12  Wall slides in forearm neutral 2 x 12 R    UE [place] 2 x 12 UE [place] 3 x 10 UE [place] 3 x 10    Wall slides in forearm neutral, 3 x 5  Wall slides in forearm neutral 3 x 8    UE [place] 3 x10  UE [place] 3 x 10     Wall slides, forearm neutral 3 x 8 UE [place] 2 x 10     Wall slides, forearm neutral 3 x 8   Mid-trap  Prone  2# 3 x 10 Prone  2# 3 x 10 Prone  2# 3 x 10 Prone  0# 2 x 12 Prone  0# x 10  1# 2 x 10 Prone  0# 2 x 10 Side-lying isometrics  3 x 6   Prone  0# 3 x 10 Prone  3# 3 x 8   Lower trap Prone  0# 3 x 10 Prone  1# 1 x 10  0# 1 x 10 Prone  0# 3 x 10 Prone  0# 2 x 12 1# 3 x 10  Prone  0# 2 x 10  Side-lying isometrics   3 x 5  Prone  0# 3 x 10  Prone  1# 3 x 10   Bicep curls    5# 2 x 15                              HEP: none added today. Treatment/Session Assessment:    · Response to Treatment:  Continues to improve with strength and range of motion. Remains limited by pain on occasion but is progressing with function of R UE.  · Compliance with Program/Exercises: Compliant  · Recommendations/Intent for next treatment session: \"Next visit will focus on improving strength and function of R UE\".    Total Treatment Duration: 40 Minutes  PT Patient Time In/Time Out  Time In: 6515  Time Out: [address], PT

## 2019-02-20 ENCOUNTER — HOSPITAL ENCOUNTER (OUTPATIENT)
Dept: PHYSICAL THERAPY | Age: 29
Discharge: HOME | End: 2019-02-20
Payer: COMMERCIAL

## 2019-02-20 PROCEDURE — 97110 THERAPEUTIC EXERCISES: CPT

## 2019-02-20 NOTE — PROGRESS NOTES
[employer]  : 1990  Payor: [address] [place] [address] Road / Plan: Rafael Anderson / Product Type: Workers Comp /  [address]  at [facility]  [address] [place] [address], [address] Tila Thacker.  [phone]   [phone]       OUTPATIENT PHYSICAL [address] Note 2019     ICD-10: Treatment Diagnosis: Pain in right shoulder (M25.511), Stiffness of right shoulder, not elsewhere classified (M25.611)  Precautions/Allergies:   Patient has no known allergies. Fall Risk Score: 0 (? 5 = High Risk)  MD Orders: Evaluate and treat, strengthening, range of motion, home exercise program, \"\"full motion, full strength, no restrictions\"  MEDICAL/REFERRING DIAGNOSIS:  S/P RT Shoulder Arthroscopy, Arthroscopic distal clavicle resection   DATE OF ONSET: 18 (surgery), 3/6/18 (injury)  REFERRING PHYSICIAN: Linsey Buck MD  RETURN PHYSICIAN APPOINTMENT: 3/5/19     1-8-19 ASSESSMENT:  Ms. Ricki Maharaj 1 month s/p R shoulder arthroscopy with distal clavicle resection and debridement of glenohumeral joint. Patient exhibits improved R shoulder AROM in all directions and improving strength, but remains limited by discomfort. Pt is a good candidate for continued therapy to improve these deficits and facilitate improved strength and AROM in order to restore full function to R UE. PROBLEM LIST (Impacting functional limitations):  1. Decreased [place] with Home Exercise Program  2. Post-op R shoulder pain and swelling  3. Decreased R shoulder ROM   4. Weakness R shoulder INTERVENTIONS PLANNED:  1. Thermal and electric modalities, manual therapies for pain   2. Manual therapies, therapeutic exercises, HEP for ROM    3. Therapeutic exercises and HEP for strength   TREATMENT PLAN:  Effective Dates: 2019 TO 2019.  Frequency/Duration: 2 visits per week for 8 weeks (in anticipation of additional visits ordered by physician at follow up appointments)  GOALS: (Goals have been discussed and agreed upon with patient.)  Short-Term Functional Goals: Time Frame: 4 weeks  1. Report no more than 2-3/10 intermittent pain to R shoulder with compensatory use during basic functional activities, and score less than 60% on the DASH. Ongoing, progressing 1-8-19  2. R shoulder PROM forward elevation greater than 160 degrees and external rotation greater than 90 degrees to progress into functional ranges. Ongoing, progressing 1-8-19  3. Demonstrate good R shoulder isometric strength with manual testing to progress into strength phase. MET 1-8-19  4. Independent with initial HEP. Discharge Goals: Time Frame: 8 weeks  1. No more than 2-3/10 intermittent pain R shoulder with return to normalized household and work activities, and score less than 20% on the DASH. 2. R shoulder AROM forward elevation greater than 160 degrees, active external rotation greater than T1-2 with functional reaching, and strength to shoulder are grossly WNL's for safe use with normalized activities. 3. Demonstrate good functional shoulder strength and endurance for return to normalized household and work activities. 4. Independent with advanced shoulder HEP for continued self-management. Rehabilitation Potential For Stated Goals: GOOD                The information in this section was collected on 12-13-18 (except where otherwise noted). HISTORY:   History of Present Injury/Illness (Reason for Referral): Pt reports that she injured her R shoulder while at work on March 6, 2018 when she lifted a box and felt sudden pain in her R shoulder. Attended 12 visits of physical therapy with no relief, and underwent surgery on 12/6/18. Past Medical History/Comorbidities: Ms. Milton Deshpande  has a past medical history of Asthma, BMI 50.0-59.9, adult ([address] and Former smoker.  She also has no past medical history of Adverse effect of anesthesia, Aneurysm ([address] Arrhythmia, Arthritis, Autoimmune disease ([address] CAD (coronary artery disease), Cancer ([address] Chronic kidney disease, Chronic obstructive pulmonary disease ([address] Chronic pain, Coagulation disorder ([address] Diabetes ([address] Difficult intubation, Endocarditis, GERD (gastroesophageal reflux disease), Heart failure ([address] Hypertension, Ill-defined condition, Liver disease, Malignant hyperthermia due to anesthesia, Nausea & vomiting, Nicotine vapor product user, Non-nicotine vapor product user, Pseudocholinesterase deficiency, Psychiatric disorder, PUD (peptic ulcer disease), Rheumatic fever, Seizures ([address] Sleep apnea, Stroke ([address] Thromboembolus ([address] or Thyroid disease. Ms. Milton Deshpande  has a past surgical history that includes hx dilation and curettage (2017). Social History/Living Environment:  Patient lives alone in a 1-story house usually, but is staying with her mother and siblings in a two-story house while recovering from this surgery. Prior Level of Function/Work/Activity: Patient previously worked full-time for [employer] at [employer]. 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 Dominant Side:         RIGHT  Current Medications:     -Promethazine 25 mg - nausea  - Ketorolac 10 MG - pain  - Hydromorphone 2 MG - pain  - Temazepam 15 MG - sleeping  - Lyrica - but has not been usin  - Takes an anti-inflammatory and a cream for pain and stiffness  - Tamodol  -Ambien  - Prednisone and another medication that she cannot recall, prescribed 2-19-19   Date Last Reviewed:  2-20-19   EXAMINATION:   1-8-19  Observation/Orthostatic Postural Assessment:  Pt arrived to PT in no obvious distress. Palpation:  Tenderness to R anterior deltoid.      ROM:                  R shoulder AROM (standing) (2-5-19)   Flexion: 150 degrees   ER (elbow at side) 70 degrees   ABD: 142 deg   IR (reaching behind back): L4-5              Strength:         R shoulder  (1-8-19)   flexion 4+/5   ABD 4+/5   ER (elbow at side): 4/5   IR (elbow at side): 5/5         Special Tests: None  Neurological Screen: Motor and sensory to L UE intact. Functional Mobility:  Sit to/from Stand: independent. Bed Mobility: independent. Independent with basic mobility. Balance:  Not tested        Body Structures Involved:  1. Nerves  2. Bones  3. Joints  4. Muscles Body Functions Affected:  1. Sensory/Pain  2. Neuromusculoskeletal  3. Movement Related  4. Skin Related Activities and Participation Affected:  1. Self Care  2. Interpersonal Interactions and Relationships  3. Community, Social and Civic Life   CLINICAL PRESENTATION:   CLINICAL DECISION MAKING:   Outcome Measure: Tool Used: Disabilities of the Arm, Shoulder and Hand (DASH) Questionnaire - Quick Version  Score:  Initial: 53/55 or 95% limited (12-13-18)  45/55 or 77% limited (Date: 1-8-19 ) Most recent: 44/55 or 75% limited (2-5-19)   Interpretation of Score: The DASH is designed to measure the activities of daily living in person's with upper extremity dysfunction or pain. Each section is scored on a 1-5 scale, 5 representing the greatest disability. The scores of each section are added together for a total score of 55. This number is divided by 11, followed by subtracting 1 and multiplying by 25 to get a percent score of disability. This value represents the percentage disability: 0-20% minimal disability; 20-40% moderate disability; 40-60% severe disability; % dependent for care or exaggerated symptom behavior. Minimal detectable change is 12%. Score 11 12-19 20-28 29-37 38-45 46-54 55   Modifier CH CI CJ CK CL CM CN     Medical Necessity:   · Skilled intervention continues to be required due to R shoulder pain s/p R shoulder arthroscopy. Reason for Services/Other Comments:  · Patient continues to require skilled intervention due to R shoulder pain s/p R shoulder arthroscopy.             TREATMENT:   (In addition to Assessment/Re-Assessment sessions the following treatments were rendered)  Pre-treatment Symptoms/Complaints: Reports that her shoulder is sore and uncomfortable today. Otherwise no new complaints. Pain: Initial:   No VAS upon arrival  Post Session:  No VAS after PT       Manual Therapy (0 minutes) none today    Therapeutic Exercise ( 35 minutes) to improve R upper extremity function. Cues required and provided when necessary. 5 min warm-up on upper body ergometer to begin PT. Tactile and verbal cues required as needed to demonstrate appropriate exercise technique.   .   Date  1-4-18 Date  1-8-19 Date  1-14-19 Date  1-16-19 Date  1-23-19 Date  1-24-19 Date  1-29-19 Date  2-5-19 Date  2-11-19 Date  2-13-19 Date  2-20-19   Activity/Exercise              Flexion Standing, to 90 3 x 6  Active, through available ROM x 10  1# 2 x 10  2# 3 x 10   2# 3 x 10      2# 3 x 10  2# 3 x 10  2# 3 x 10 2# 3 x 10    Supine press 1# 3 x 10 1# x 10  2# 3 x 10 2# x 10  3# 2 x 10 2# x 10  3# 2 x 10  2# x 10  4# 3 x 10  3# 3 x 10 3# x 12, x 8, x 8 - 3# 3 x 10  3# 3 x 10 4# 3 x 10    External rotations 3# 3 x 10 3# 3 x 10 4# 3 x 10  Side-lying  3# 3 x 10     Green, 3 x 10 single band Side-lying  3# 3 x 10     Green, 3 x 10 single     Side-lying  3 x 12 0#     External rotations isometric in side-lying 3 x 10 Side-lying  3# 3 x 10     Single band, green 3 x 10  Single band, green 3 x 10  Side-lying   3# 3 x 10      Side-lying  3# 3 x 10  Side-lying  3# 3 x 10    Abduction Side-lying  2# 3 x 10  Side-lying  4# 3 x 10     Scaption  0# 3 x 10  Scaption  1# 2 x 10 Scaption  2# 3 x 10   Side-lying  3# 3 x 10 Standing   2# 3 x 10  Standing, 2# 3 x 10 Standing, 3# 3 x 10  Standing  2# 3 x 10    Prone rows  5# 3 x 10 6# 3 x 10 Prone extensions  0# 2 x 10  Prone   5# 3 x 10 R Prone  6# 3 x 10 R Bentover row  7# 3 x 8 R Bentover row  7# 3 x 10 R     Scapular retractions Blue, elbows ext  3 x 12 B     Cable, unilateral  R 7# 3 x 8 7# 3 x 10  7# cable row, unilateral  3 x 10  10# 3 x 10 unilateral, R    Band pull aparts, red 2 x 10    Black, 3 x 15 B Black, elbows flexed 3 x 10 B Cable row, 10# 2 x 10 R   Cable row  10# 3 x 10  Cable row  10# 3 x 10    AAROM UE [place] 3 x 10 UE [place] 3 x 10  UE [place] 1 x 10     Wall slides 2 x 10 in forearm neutral Wall slides in forearm neutral 2 x 12 R    UE [place] 2 x 12  Wall slides in forearm neutral 2 x 12 R    UE [place] 2 x 12 UE [place] 3 x 10 UE [place] 3 x 10    Wall slides in forearm neutral, 3 x 5  Wall slides in forearm neutral 3 x 8    UE [place] 3 x10  UE [place] 3 x 10     Wall slides, forearm neutral 3 x 8 UE [place] 2 x 10     Wall slides, forearm neutral 3 x 8 UE [place] 3 x 10    Wall slides forearm neutral 3 x 10    Mid-trap  Prone  2# 3 x 10 Prone  2# 3 x 10 Prone  2# 3 x 10 Prone  0# 2 x 12 Prone  0# x 10  1# 2 x 10 Prone  0# 2 x 10 Side-lying isometrics  3 x 6   Prone  0# 3 x 10 Prone  3# 3 x 8 Prone  3#   Lower trap Prone  0# 3 x 10 Prone  1# 1 x 10  0# 1 x 10 Prone  0# 3 x 10 Prone  0# 2 x 12 1# 3 x 10  Prone  0# 2 x 10  Side-lying isometrics   3 x 5  Prone  0# 3 x 10  Prone  1# 3 x 10 Prone  1#    Bicep curls    5# 2 x 15                                HEP: none added today. Treatment/Session Assessment:    · Response to Treatment:  Patient progressing. Continues to have some anterior shoulder pain with movement but is progressing well. · Compliance with Program/Exercises: Compliant  · Recommendations/Intent for next treatment session: \"Next visit will focus on improving strength and function of R UE\".    Total Treatment Duration: 35 Minutes  PT Patient Time In/Time Out  Time In: 3620  Time Out: 2600 [facility], PT

## 2019-02-22 ENCOUNTER — HOSPITAL ENCOUNTER (OUTPATIENT)
Dept: PHYSICAL THERAPY | Age: 29
Discharge: HOME | End: 2019-02-22
Payer: COMMERCIAL

## 2019-02-22 PROCEDURE — 97110 THERAPEUTIC EXERCISES: CPT

## 2019-02-22 NOTE — PROGRESS NOTES
[employer]  : 1990  Payor: [address] / Plan: Ephraim Del Cid / Product Type: Workers Comp /  [address]  at [address] [place] [address] Rd  [address], [address].  [phone]   [phone]       OUTPATIENT PHYSICAL [address] Note 2019     ICD-10: Treatment Diagnosis: Pain in right shoulder (M25.511), Stiffness of right shoulder, not elsewhere classified (M25.611)  Precautions/Allergies:   Patient has no known allergies. Fall Risk Score: 0 (? 5 = High Risk)  MD Orders: Evaluate and treat, strengthening, range of motion, home exercise program, \"\"full motion, full strength, no restrictions\"  MEDICAL/REFERRING DIAGNOSIS:  S/P RT Shoulder Arthroscopy, Arthroscopic distal clavicle resection   DATE OF ONSET: 18 (surgery), 3/6/18 (injury)  REFERRING PHYSICIAN: Lisa Reynolds MD  RETURN PHYSICIAN APPOINTMENT: 3/5/19     1-8-19 ASSESSMENT:  Ms. Lieutenant Nguyen 1 month s/p R shoulder arthroscopy with distal clavicle resection and debridement of glenohumeral joint. Patient exhibits improved R shoulder AROM in all directions and improving strength, but remains limited by discomfort. Pt is a good candidate for continued therapy to improve these deficits and facilitate improved strength and AROM in order to restore full function to R UE. PROBLEM LIST (Impacting functional limitations):  1. Decreased [place] with Home Exercise Program  2. Post-op R shoulder pain and swelling  3. Decreased R shoulder ROM   4. Weakness R shoulder INTERVENTIONS PLANNED:  1. Thermal and electric modalities, manual therapies for pain   2. Manual therapies, therapeutic exercises, HEP for ROM    3. Therapeutic exercises and HEP for strength   TREATMENT PLAN:  Effective Dates: 2019 TO 2019.  Frequency/Duration: 2 visits per week for 8 weeks (in anticipation of additional visits ordered by physician at follow up appointments)  GOALS: (Goals have been discussed and agreed upon with patient.)  Short-Term Functional Goals: Time Frame: 4 weeks  1. Report no more than 2-3/10 intermittent pain to R shoulder with compensatory use during basic functional activities, and score less than 60% on the DASH. Ongoing, progressing 1-8-19  2. R shoulder PROM forward elevation greater than 160 degrees and external rotation greater than 90 degrees to progress into functional ranges. Ongoing, progressing 1-8-19  3. Demonstrate good R shoulder isometric strength with manual testing to progress into strength phase. MET 1-8-19  4. Independent with initial HEP. Discharge Goals: Time Frame: 8 weeks  1. No more than 2-3/10 intermittent pain R shoulder with return to normalized household and work activities, and score less than 20% on the DASH. 2. R shoulder AROM forward elevation greater than 160 degrees, active external rotation greater than T1-2 with functional reaching, and strength to shoulder are grossly WNL's for safe use with normalized activities. 3. Demonstrate good functional shoulder strength and endurance for return to normalized household and work activities. 4. Independent with advanced shoulder HEP for continued self-management. Rehabilitation Potential For Stated Goals: GOOD                The information in this section was collected on 12-13-18 (except where otherwise noted). HISTORY:   History of Present Injury/Illness (Reason for Referral): Pt reports that she injured her R shoulder while at work on March 6, 2018 when she lifted a box and felt sudden pain in her R shoulder. Attended 12 visits of physical therapy with no relief, and underwent surgery on 12/6/18. Past Medical History/Comorbidities: Ms. Jean Mustfaa  has a past medical history of Asthma, BMI 50.0-59.9, adult ([address] and Former smoker.  She also has no past medical history of Adverse effect of anesthesia, Aneurysm ([address] Arrhythmia, Arthritis, Autoimmune disease ([address] CAD (coronary artery disease), Cancer ([address] Chronic kidney disease, Chronic obstructive pulmonary disease ([address] Chronic pain, Coagulation disorder ([address] Diabetes ([address] Difficult intubation, Endocarditis, GERD (gastroesophageal reflux disease), Heart failure ([address] Hypertension, Ill-defined condition, Liver disease, Malignant hyperthermia due to anesthesia, Nausea & vomiting, Nicotine vapor product user, Non-nicotine vapor product user, Pseudocholinesterase deficiency, Psychiatric disorder, PUD (peptic ulcer disease), Rheumatic fever, Seizures ([address] Sleep apnea, Stroke ([address] Thromboembolus ([address] or Thyroid disease. Ms. Aleksandar Santos  has a past surgical history that includes hx dilation and curettage (2017). Social History/Living Environment:  Patient lives alone in a 1-story house usually, but is staying with her mother and siblings in a two-story house while recovering from this surgery. Prior Level of Function/Work/Activity: Patient previously worked full-time for [employer] at [employer]. 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 Dominant Side:         RIGHT  Current Medications:     -Promethazine 25 mg - nausea  - Ketorolac 10 MG - pain  - Hydromorphone 2 MG - pain  - Temazepam 15 MG - sleeping  - Lyrica - but has not been usin  - Takes an anti-inflammatory and a cream for pain and stiffness  - Tamodol  -Ambien  - Prednisone and another medication that she cannot recall, prescribed 2-19-19   Date Last Reviewed:  2-20-19   EXAMINATION:   1-8-19  Observation/Orthostatic Postural Assessment:  Pt arrived to PT in no obvious distress. Palpation:  Tenderness to R anterior deltoid.      ROM:                  R shoulder AROM (standing) (2-5-19)   Flexion: 150 degrees   ER (elbow at side) 70 degrees   ABD: 142 deg   IR (reaching behind back): L4-5              Strength:         R shoulder  (1-8-19)   flexion 4+/5   ABD 4+/5   ER (elbow at side): 4/5   IR (elbow at side): 5/5         Special Tests: None  Neurological Screen: Motor and sensory to L UE intact. Functional Mobility:  Sit to/from Stand: independent. Bed Mobility: independent. Independent with basic mobility. Balance:  Not tested        Body Structures Involved:  1. Nerves  2. Bones  3. Joints  4. Muscles Body Functions Affected:  1. Sensory/Pain  2. Neuromusculoskeletal  3. Movement Related  4. Skin Related Activities and Participation Affected:  1. Self Care  2. Interpersonal Interactions and Relationships  3. Community, Social and Civic Life   CLINICAL PRESENTATION:   CLINICAL DECISION MAKING:   Outcome Measure: Tool Used: Disabilities of the Arm, Shoulder and Hand (DASH) Questionnaire - Quick Version  Score:  Initial: 53/55 or 95% limited (12-13-18)  45/55 or 77% limited (Date: 1-8-19 ) Most recent: 44/55 or 75% limited (2-5-19)   Interpretation of Score: The DASH is designed to measure the activities of daily living in person's with upper extremity dysfunction or pain. Each section is scored on a 1-5 scale, 5 representing the greatest disability. The scores of each section are added together for a total score of 55. This number is divided by 11, followed by subtracting 1 and multiplying by 25 to get a percent score of disability. This value represents the percentage disability: 0-20% minimal disability; 20-40% moderate disability; 40-60% severe disability; % dependent for care or exaggerated symptom behavior. Minimal detectable change is 12%. Score 11 12-19 20-28 29-37 38-45 46-54 55   Modifier CH CI CJ CK CL CM CN     Medical Necessity:   · Skilled intervention continues to be required due to R shoulder pain s/p R shoulder arthroscopy. Reason for Services/Other Comments:  · Patient continues to require skilled intervention due to R shoulder pain s/p R shoulder arthroscopy.             TREATMENT:   (In addition to Assessment/Re-Assessment sessions the following treatments were rendered)  Pre-treatment Symptoms/Complaints: No new complaints of R shoulder discomfort. Pain: Initial:   No VAS  Post Session:  No VAS        Manual Therapy (0 minutes) none today    Therapeutic Exercise ( 35 minutes) to improve R upper extremity function. Cues required and provided when necessary. 5 min warm-up on upper body ergometer to begin PT. Tactile and verbal cues required as needed to demonstrate appropriate exercise technique.   .   Date  1-24-19 Date  1-29-19 Date  2-5-19 Date  2-11-19 Date  2-13-19 Date  2-20-19 Date  2-22-19   Activity/Exercise          Flexion  2# 3 x 10      2# 3 x 10  2# 3 x 10  2# 3 x 10 2# 3 x 10  3# 3 x 10   Supine press 3# 3 x 10 3# x 12, x 8, x 8 - 3# 3 x 10  3# 3 x 10 4# 3 x 10  4# 3 x 10    External rotations Side-lying  3 x 12 0#     External rotations isometric in side-lying 3 x 10 Side-lying  3# 3 x 10     Single band, green 3 x 10  Single band, green 3 x 10  Side-lying   3# 3 x 10      Side-lying  3# 3 x 10  Side-lying  3# 3 x 10  Side-lying  4# 3 x 10    Standing, blue 3 x 10, single band   Abduction  Side-lying  3# 3 x 10 Standing   2# 3 x 10  Standing, 2# 3 x 10 Standing, 3# 3 x 10  Standing  2# 3 x 10  Standing  3# 3 x 10    Prone rows Prone   5# 3 x 10 R Prone  6# 3 x 10 R Bentover row  7# 3 x 8 R [place]over row  7# 3 x 10 R      Scapular retractions Black, elbows flexed 3 x 10 B Cable row, 10# 2 x 10 R   Cable row  10# 3 x 10  Cable row  10# 3 x 10  Cable row  10# x 30   AAROM UE [place] 3 x 10 UE [place] 3 x 10    Wall slides in forearm neutral, 3 x 5  Wall slides in forearm neutral 3 x 8    UE [place] 3 x10  UE [place] 3 x 10     Wall slides, forearm neutral 3 x 8 UE [place] 2 x 10     Wall slides, forearm neutral 3 x 8 UE [place] 3 x 10    Wall slides forearm neutral 3 x 10  Resisted wall slides,, green 3 x 10    Mid-trap  Prone  0# 2 x 10 Side-lying isometrics  3 x 6   Prone  0# 3 x 10 Prone  3# 3 x 8 Prone  3# Prone  3# 3 x 10   Lower trap Prone  0# 2 x 10  Side-lying isometrics   3 x 5  Prone  0# 3 x 10  Prone  1# 3 x 10 Prone  1#  Prone 3# 3 x 10   Bicep curls       5# 3 x 10                    HEP: none added today. Treatment/Session Assessment:    · Response to Treatment:  Patient continues to make good progress. Less grimmacing during performance of therapeutic exercises today. Improved ROM with wall slides today. · Compliance with Program/Exercises: Compliant  · Recommendations/Intent for next treatment session: \"Next visit will focus on improving strength and function of R UE\".    Total Treatment Duration: 35 Minutes  PT Patient Time In/Time Out  Time In: 1120  Time Out: 250 [address]

## 2019-02-27 ENCOUNTER — HOSPITAL ENCOUNTER (OUTPATIENT)
Dept: PHYSICAL THERAPY | Age: 29
Discharge: HOME | End: 2019-02-27
Payer: COMMERCIAL

## 2019-02-27 PROCEDURE — 97110 THERAPEUTIC EXERCISES: CPT

## 2019-02-27 NOTE — PROGRESS NOTES
[employer]  : 1990  Payor: [address] [place] [address] Road / Plan: Jeff Poet / Product Type: Workers Comp /  [address]  at [facility] CAIO MISTRY  [address] [place] [address], 4 [facility], [address] [place] [address]  [phone]   [phone]       OUTPATIENT PHYSICAL [address] Note 2019     ICD-10: Treatment Diagnosis: Pain in right shoulder (M25.511), Stiffness of right shoulder, not elsewhere classified (M25.611)  Precautions/Allergies:   Patient has no known allergies. Fall Risk Score: 0 (? 5 = High Risk)  MD Orders: Evaluate and treat, strengthening, range of motion, home exercise program, \"\"full motion, full strength, no restrictions\"  MEDICAL/REFERRING DIAGNOSIS:  S/P RT Shoulder Arthroscopy, Arthroscopic distal clavicle resection   DATE OF ONSET: 18 (surgery), 3/6/18 (injury)  REFERRING PHYSICIAN: Bang Singh MD  RETURN PHYSICIAN APPOINTMENT: 3/5/19     1-8-19 ASSESSMENT:  Ms. Doc Mojica 1 month s/p R shoulder arthroscopy with distal clavicle resection and debridement of glenohumeral joint. Patient exhibits improved R shoulder AROM in all directions and improving strength, but remains limited by discomfort. Pt is a good candidate for continued therapy to improve these deficits and facilitate improved strength and AROM in order to restore full function to R UE. PROBLEM LIST (Impacting functional limitations):  1. Decreased [place] with Home Exercise Program  2. Post-op R shoulder pain and swelling  3. Decreased R shoulder ROM   4. Weakness R shoulder INTERVENTIONS PLANNED:  1. Thermal and electric modalities, manual therapies for pain   2. Manual therapies, therapeutic exercises, HEP for ROM    3. Therapeutic exercises and HEP for strength   TREATMENT PLAN:  Effective Dates: 2019 TO 2019.  Frequency/Duration: 2 visits per week for 8 weeks (in anticipation of additional visits ordered by physician at follow up appointments)  GOALS: (Goals have been discussed and agreed upon with patient.)  Short-Term Functional Goals: Time Frame: 4 weeks  1. Report no more than 2-3/10 intermittent pain to R shoulder with compensatory use during basic functional activities, and score less than 60% on the DASH. Ongoing, progressing 1-8-19  2. R shoulder PROM forward elevation greater than 160 degrees and external rotation greater than 90 degrees to progress into functional ranges. Ongoing, progressing 1-8-19  3. Demonstrate good R shoulder isometric strength with manual testing to progress into strength phase. MET 1-8-19  4. Independent with initial HEP. Discharge Goals: Time Frame: 8 weeks  1. No more than 2-3/10 intermittent pain R shoulder with return to normalized household and work activities, and score less than 20% on the DASH. 2. R shoulder AROM forward elevation greater than 160 degrees, active external rotation greater than T1-2 with functional reaching, and strength to shoulder are grossly WNL's for safe use with normalized activities. 3. Demonstrate good functional shoulder strength and endurance for return to normalized household and work activities. 4. Independent with advanced shoulder HEP for continued self-management. Rehabilitation Potential For Stated Goals: GOOD                The information in this section was collected on 12-13-18 (except where otherwise noted). HISTORY:   History of Present Injury/Illness (Reason for Referral): Pt reports that she injured her R shoulder while at work on March 6, 2018 when she lifted a box and felt sudden pain in her R shoulder. Attended 12 visits of physical therapy with no relief, and underwent surgery on 12/6/18. Past Medical History/Comorbidities: Ms. Noman Michael  has a past medical history of Asthma, BMI 50.0-59.9, adult ([address] and Former smoker.  She also has no past medical history of Adverse effect of anesthesia, Aneurysm ([address] Arrhythmia, Arthritis, Autoimmune disease ([address] CAD (coronary artery disease), Cancer ([address] Chronic kidney disease, Chronic obstructive pulmonary disease ([address] Chronic pain, Coagulation disorder ([address] Diabetes ([address] Difficult intubation, Endocarditis, GERD (gastroesophageal reflux disease), Heart failure ([address] Hypertension, Ill-defined condition, Liver disease, Malignant hyperthermia due to anesthesia, Nausea & vomiting, Nicotine vapor product user, Non-nicotine vapor product user, Pseudocholinesterase deficiency, Psychiatric disorder, PUD (peptic ulcer disease), Rheumatic fever, Seizures ([address] Sleep apnea, Stroke ([address] Thromboembolus ([address] or Thyroid disease. Ms. Noman Michael  has a past surgical history that includes hx dilation and curettage (2017). Social History/Living Environment:  Patient lives alone in a 1-story house usually, but is staying with her mother and siblings in a two-story house while recovering from this surgery. Prior Level of Function/Work/Activity: Patient previously worked full-time for [employer] at [employer]. 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 Dominant Side:         RIGHT  Current Medications:     -Promethazine 25 mg - nausea  - Ketorolac 10 MG - pain  - Hydromorphone 2 MG - pain  - Temazepam 15 MG - sleeping  - Lyrica - but has not been usin  - Takes an anti-inflammatory and a cream for pain and stiffness  - Tamodol  -Ambien  - Prednisone and another medication that she cannot recall, prescribed 2-19-19  - \"Water pill\" added at last MD visit   Date Last Reviewed:  2-27-19   EXAMINATION:   1-8-19  Observation/Orthostatic Postural Assessment:  Pt arrived to PT in no obvious distress. Palpation:  Tenderness to R anterior deltoid.      ROM:                  R shoulder AROM (standing) (2-5-19)   Flexion: 150 degrees   ER (elbow at side) 70 degrees   ABD: 142 deg   IR (reaching behind back): L4-5              Strength:         R shoulder  (1-8-19)   flexion 4+/5   ABD 4+/5   ER (elbow at side): 4/5   IR (elbow at side): 5/5         Special Tests: None  Neurological Screen: Motor and sensory to L UE intact. Functional Mobility:  Sit to/from Stand: independent. Bed Mobility: independent. Independent with basic mobility. Balance:  Not tested        Body Structures Involved:  1. Nerves  2. Bones  3. Joints  4. Muscles Body Functions Affected:  1. Sensory/Pain  2. Neuromusculoskeletal  3. Movement Related  4. Skin Related Activities and Participation Affected:  1. Self Care  2. Interpersonal Interactions and Relationships  3. Community, Social and Civic Life   CLINICAL PRESENTATION:   CLINICAL DECISION MAKING:   Outcome Measure: Tool Used: Disabilities of the Arm, Shoulder and Hand (DASH) Questionnaire - Quick Version  Score:  Initial: 53/55 or 95% limited (12-13-18)  45/55 or 77% limited (Date: 1-8-19 ) Most recent: 44/55 or 75% limited (2-5-19)   Interpretation of Score: The DASH is designed to measure the activities of daily living in person's with upper extremity dysfunction or pain. Each section is scored on a 1-5 scale, 5 representing the greatest disability. The scores of each section are added together for a total score of 55. This number is divided by 11, followed by subtracting 1 and multiplying by 25 to get a percent score of disability. This value represents the percentage disability: 0-20% minimal disability; 20-40% moderate disability; 40-60% severe disability; % dependent for care or exaggerated symptom behavior. Minimal detectable change is 12%. Score 11 12-19 20-28 29-37 38-45 46-54 55   Modifier CH CI CJ CK CL CM CN     Medical Necessity:   · Skilled intervention continues to be required due to R shoulder pain s/p R shoulder arthroscopy. Reason for Services/Other Comments:  · Patient continues to require skilled intervention due to R shoulder pain s/p R shoulder arthroscopy.             TREATMENT:   (In addition to Assessment/Re-Assessment sessions the following treatments were rendered)  Pre-treatment Symptoms/Complaints: Reports that shoulder continues to have discomfort to R shoulder, which has really not changed since the surgery. Also reports that she went to the MD regarding water retention in feet, and short-term memory troubles, which she says has persisted for a few days. Reports that her blood pressure has been fine. Pain: Initial:   No VAS  Post Session:  No VAS      BP: 130/83 with patient seated during PT    Manual Therapy (0 minutes) none today    Therapeutic Exercise ( 40 minutes) to improve R upper extremity function. Cues required and provided when necessary. 5 min warm-up on upper body ergometer to begin PT. Tactile and verbal cues required as needed to demonstrate appropriate exercise technique.   .   Date  1-24-19 Date  1-29-19 Date  2-5-19 Date  2-11-19 Date  2-13-19 Date  2-20-19 Date  2-22-19 2-27-19   Activity/Exercise           Flexion  2# 3 x 10      2# 3 x 10  2# 3 x 10  2# 3 x 10 2# 3 x 10  3# 3 x 10 3# 3 x 10    Supine press 3# 3 x 10 3# x 12, x 8, x 8 - 3# 3 x 10  3# 3 x 10 4# 3 x 10  4# 3 x 10  5# 3 x 10    External rotations Side-lying  3 x 12 0#     External rotations isometric in side-lying 3 x 10 Side-lying  3# 3 x 10     Single band, green 3 x 10  Single band, green 3 x 10  Side-lying   3# 3 x 10      Side-lying  3# 3 x 10  Side-lying  3# 3 x 10  Side-lying  4# 3 x 10    Standing, blue 3 x 10, single band Side-lying  4# 3 x 10     Standing    Abduction  Side-lying  3# 3 x 10 Standing   2# 3 x 10  Standing, 2# 3 x 10 Standing, 3# 3 x 10  Standing  2# 3 x 10  Standing  3# 3 x 10  Standing   3# 3 x 10   Prone rows Prone   5# 3 x 10 R Prone  6# 3 x 10 R Bentover row  7# 3 x 8 R Bentover row  7# 3 x 10 R       Scapular retractions Black, elbows flexed 3 x 10 B Cable row, 10# 2 x 10 R   Cable row  10# 3 x 10  Cable row  10# 3 x 10  Cable row  10# x 30 Cable row  10# 3 x 10    Lat pulldowns  13# 3 x 10 B   AAROM UE [place] 3 x 10 UE [place] 3 x 10    Wall slides in forearm neutral, 3 x 5  Wall slides in forearm neutral 3 x 8    UE Barto 3 x10  UE Barto 3 x 10     Wall slides, forearm neutral 3 x 8 UE Barto 2 x 10     Wall slides, forearm neutral 3 x 8 UE Barto 3 x 10    Wall slides forearm neutral 3 x 10  Resisted wall slides,, green 3 x 10     Mid-trap  Prone  0# 2 x 10 Side-lying isometrics  3 x 6   Prone  0# 3 x 10 Prone  3# 3 x 8 Prone  3# Prone  3# 3 x 10 Prone  3# 3 x 10    Lower trap Prone  0# 2 x 10  Side-lying isometrics   3 x 5  Prone  0# 3 x 10  Prone  1# 3 x 10 Prone  1#  Prone   3# 3 x 10 Prone  3# 3 x 10    Bicep curls       5# 3 x 10    Push ups        3 x 10 , wall push ups          HEP: none added today. Treatment/Session Assessment:    · Response to Treatment:  Patient making progress with strength and active range of motion, but remains limited by pain. · Compliance with Program/Exercises: Compliant  · Recommendations/Intent for next treatment session: \"Next visit will focus on improving strength and function of R UE\".    Total Treatment Duration: 40 Minutes  PT Patient Time In/Time Out  Time In: 1600  Time Out: [address] [facility] [address], PT

## 2019-03-05 ENCOUNTER — HOSPITAL ENCOUNTER (OUTPATIENT)
Dept: PHYSICAL THERAPY | Age: 29
Discharge: HOME | End: 2019-03-05
Payer: COMMERCIAL

## 2019-03-05 PROCEDURE — 97110 THERAPEUTIC EXERCISES: CPT

## 2019-03-05 PROCEDURE — 97140 MANUAL THERAPY 1/> REGIONS: CPT
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
## 2019-03-13 ENCOUNTER — HOSPITAL ENCOUNTER (OUTPATIENT)
Dept: PHYSICAL THERAPY | Age: 29
Discharge: HOME | End: 2019-03-13
Payer: COMMERCIAL

## 2019-03-13 PROCEDURE — 97110 THERAPEUTIC EXERCISES: CPT

## 2019-03-13 PROCEDURE — 97140 MANUAL THERAPY 1/> REGIONS: CPT
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
## 2019-03-14 ENCOUNTER — APPOINTMENT (OUTPATIENT)
Dept: PHYSICAL THERAPY | Age: 29
End: 2019-03-14
Payer: COMMERCIAL

## 2019-03-15 NOTE — PROGRESS NOTES
Ambulatory/Rehab Services H2 Model Falls Risk Assessment    Risk Factors:       (5)  History of Recent Falls [w/in 3 months] Ability to Rise from Chair:       (0)  Ability to rise in a single movement    Falls Prevention Plan:       No modifications necessary   Total: (5 or greater = High Risk): 5      ©2010 [facility]. All Rights Reserved. [facility] Patent #[id number].  Federal Law prohibits the replication, distribution or use without written permission from [address]

## 2019-03-27 ENCOUNTER — HOSPITAL ENCOUNTER (OUTPATIENT)
Dept: PHYSICAL THERAPY | Age: 29
Discharge: HOME | End: 2019-03-27
Payer: COMMERCIAL

## 2019-03-27 PROCEDURE — 97110 THERAPEUTIC EXERCISES: CPT

## 2019-03-27 PROCEDURE — 97140 MANUAL THERAPY 1/> REGIONS: CPT

## 2019-03-27 NOTE — PROGRESS NOTES
[employer]  : 1990  [facility] [address] / Plan: Pepper Null / Product Type: Workers Comp /  [id number] at [facility] CAIO M[employer]  [address] [place] [address], [facility], [address] [place] [address]  [phone]   [phone]       OUTPATIENT PHYSICAL [address] Note 3/27/2019     ICD-10: Treatment Diagnosis: Pain in right shoulder (M25.511), Stiffness of right shoulder, not elsewhere classified (M25.611)  Precautions/Allergies:   Patient has no known allergies. Fall Risk Score: 0 (? 5 = High Risk)  MD Orders: Evaluate and treat, strengthening, range of motion, home exercise program, \"\"full motion, full strength, no restrictions\"  MEDICAL/REFERRING DIAGNOSIS:  S/P RT Shoulder Arthroscopy, Arthroscopic distal clavicle resection   DATE OF ONSET: 17 (surgery), 3/6/18 (injury)  REFERRING PHYSICIAN: Bianca Steele MD  RETURN PHYSICIAN APPOINTMENT: 5 weeks (~19)     19 ASSESSMENT:  Ms. Brayden Mason 1 month s/p R shoulder arthroscopy with distal clavicle resection and debridement of glenohumeral joint. Patient exhibits improved R shoulder AROM in all directions and improving strength, but remains limited by discomfort. Pt is a good candidate for continued therapy to improve these deficits and facilitate improved strength and AROM in order to restore full function to R UE. PROBLEM LIST (Impacting functional limitations):  1. Decreased [place] with Home Exercise Program  2. Post-op R shoulder pain and swelling  3. Decreased R shoulder ROM   4. Weakness R shoulder INTERVENTIONS PLANNED:  1. Thermal and electric modalities, manual therapies for pain   2. Manual therapies, therapeutic exercises, HEP for ROM    3. Therapeutic exercises and HEP for strength   TREATMENT PLAN:  Effective Dates: 2019 TO 2019.  Frequency/Duration: 2 visits per week for 8 weeks (in anticipation of additional visits ordered by physician at follow up appointments)  GOALS: (Goals have been discussed and agreed upon with patient.)  Short-Term Functional Goals: Time Frame: 4 weeks  1. Report no more than 2-3/10 intermittent pain to R shoulder with compensatory use during basic functional activities, and score less than 60% on the DASH. Ongoing, progressing 1-8-19  2. R shoulder PROM forward elevation greater than 160 degrees and external rotation greater than 90 degrees to progress into functional ranges. Ongoing, progressing 1-8-19  3. Demonstrate good R shoulder isometric strength with manual testing to progress into strength phase. MET 1-8-19  4. Independent with initial HEP. Discharge Goals: Time Frame: 8 weeks  1. No more than 2-3/10 intermittent pain R shoulder with return to normalized household and work activities, and score less than 20% on the DASH. 2. R shoulder AROM forward elevation greater than 160 degrees, active external rotation greater than T1-2 with functional reaching, and strength to shoulder are grossly WNL's for safe use with normalized activities. 3. Demonstrate good functional shoulder strength and endurance for return to normalized household and work activities. 4. Independent with advanced shoulder HEP for continued self-management. Rehabilitation Potential For Stated Goals: GOOD                The information in this section was collected on 12-13-18 (except where otherwise noted). HISTORY:   History of Present Injury/Illness (Reason for Referral): Pt reports that she injured her R shoulder while at work on March 6, 2018 when she lifted a box and felt sudden pain in her R shoulder. Attended 12 visits of physical therapy with no relief, and underwent surgery on 12/6/18. Past Medical History/Comorbidities: Ms. Marin Michaud  has a past medical history of Asthma, BMI 50.0-59.9, adult ([address] and Former smoker.  She also has no past medical history of Adverse effect of anesthesia, Aneurysm ([address] Arrhythmia, Arthritis, Autoimmune disease ([address] CAD (coronary artery disease), Cancer ([address] Chronic kidney disease, Chronic obstructive pulmonary disease (HCC), Chronic pain, Coagulation disorder ([address] Diabetes ([address] Difficult intubation, Endocarditis, GERD (gastroesophageal reflux disease), Heart failure ([address] Hypertension, Ill-defined condition, Liver disease, Malignant hyperthermia due to anesthesia, Nausea & vomiting, Nicotine vapor product user, Non-nicotine vapor product user, Pseudocholinesterase deficiency, Psychiatric disorder, PUD (peptic ulcer disease), Rheumatic fever, Seizures ([address] Sleep apnea, Stroke ([address] Thromboembolus ([address] or Thyroid disease. Ms. Srikanth Garcia  has a past surgical history that includes hx dilation and curettage (2017). Social History/Living Environment:  Patient lives alone in a 1-story house usually, but is staying with her mother and siblings in a two-story house while recovering from this surgery. Prior Level of Function/Work/Activity: Patient previously worked full-time for [employer] at [employer]. 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 Dominant Side:         RIGHT  Current Medications:     -Promethazine 25 mg - nausea  - Ketorolac 10 MG - pain  - Hydromorphone 2 MG - pain  - Temazepam 15 MG - sleeping  - Lyrica - but has not been usin  - Takes an anti-inflammatory and a cream for pain and stiffness  - Tamodol  -Ambien  - Prednisone and another medication that she cannot recall, prescribed 2-19-19  - \"Water pill\" added at last MD visit   Date Last Reviewed:  2-27-19   EXAMINATION:   1-8-19  Observation/Orthostatic Postural Assessment:  Pt arrived to PT in no obvious distress. Palpation:  Tenderness to R anterior deltoid.      ROM:                  R shoulder AROM (standing) (2-5-19)   Flexion: 150 degrees   ER (elbow at side) 70 degrees   ABD: 142 deg   IR (reaching behind back): L4-5              Strength:         R shoulder  (1-8-19)   flexion 4+/5   ABD 4+/5   ER (elbow at side): 4/5   IR (elbow at side): 5/5         Special Tests: None  Neurological Screen: Motor and sensory to L UE intact. Functional Mobility:  Sit to/from Stand: independent. Bed Mobility: independent. Independent with basic mobility. Balance:  Not tested        Body Structures Involved:  1. Nerves  2. Bones  3. Joints  4. Muscles Body Functions Affected:  1. Sensory/Pain  2. Neuromusculoskeletal  3. Movement Related  4. Skin Related Activities and Participation Affected:  1. Self Care  2. Interpersonal Interactions and Relationships  3. Community, Social and Civic Life   CLINICAL PRESENTATION:   CLINICAL DECISION MAKING:   Outcome Measure: Tool Used: Disabilities of the Arm, Shoulder and Hand (DASH) Questionnaire - Quick Version  Score:  Initial: 53/55 or 95% limited (12-13-18)  45/55 or 77% limited (Date: 1-8-19 ) Most recent: 44/55 or 75% limited (2-5-19)   Interpretation of Score: The DASH is designed to measure the activities of daily living in person's with upper extremity dysfunction or pain. Each section is scored on a 1-5 scale, 5 representing the greatest disability. The scores of each section are added together for a total score of 55. This number is divided by 11, followed by subtracting 1 and multiplying by 25 to get a percent score of disability. This value represents the percentage disability: 0-20% minimal disability; 20-40% moderate disability; 40-60% severe disability; % dependent for care or exaggerated symptom behavior. Minimal detectable change is 12%. Score 11 12-19 20-28 29-37 38-45 46-54 55   Modifier CH CI CJ CK CL CM CN     Medical Necessity:   · Skilled intervention continues to be required due to R shoulder pain s/p R shoulder arthroscopy. Reason for Services/Other Comments:  · Patient continues to require skilled intervention due to R shoulder pain s/p R shoulder arthroscopy.             TREATMENT:   (In addition to Assessment/Re-Assessment sessions the following treatments were rendered)  Pre-treatment Symptoms/Complaints: Reports that her shoulder pain is \"doing ok\" today. Pain: Initial:   No numeric value reported. States that it hurts \" a little bit\" Post Session:  No VAS after PT. Manual Therapy (8 minutes) to reduce R shoulder pain and improve motion without discomfort. Grade 3-4 physio mobilizations for forward elevation, abduction, IR and ER to improve range of motion. Therapeutic Exercise ( 32 minutes) to improve R upper extremity function. Cues required and provided when necessary. Tactile and verbal cues required as needed to demonstrate appropriate exercise technique. 5 min warm up on upper body ergometer.   .   Date  1-24-19 Date  1-29-19 Date  2-5-19 Date  2-11-19 Date  2-13-19 Date  2-20-19 Date  2-22-19 2-27-19 Date  3-5-19 Date  3-13-19 Date  3-27-19   Activity/Exercise              Flexion  2# 3 x 10      2# 3 x 10  2# 3 x 10  2# 3 x 10 2# 3 x 10  3# 3 x 10 3# 3 x 10  Resisted wall slides, red band 3 x 10  Resisted wall slides, flexion, 3 x 10, blue    Standing, 3# 3 x 10  Standing flexion  3# 3 x 10   Supine press 3# 3 x 10 3# x 12, x 8, x 8 - 3# 3 x 10  3# 3 x 10 4# 3 x 10  4# 3 x 10  5# 3 x 10  5# 3 x 10 5# 3 x 10  6# 2 x 12   External rotations Side-lying  3 x 12 0#     External rotations isometric in side-lying 3 x 10 Side-lying  3# 3 x 10     Single band, green 3 x 10  Single band, green 3 x 10  Side-lying   3# 3 x 10      Side-lying  3# 3 x 10  Side-lying  3# 3 x 10  Side-lying  4# 3 x 10    Standing, blue 3 x 10, single band Side-lying  4# 3 x 10     Standing  Side-lying  3# 3 x 10     Standing single band, blue 3 x 10  Side-lying  3# 3 x 10      Single band, blue    Side-lying 4# 3 x 10   Abduction  Side-lying  3# 3 x 10 Standing   2# 3 x 10  Standing, 2# 3 x 10 Standing, 3# 3 x 10  Standing  2# 3 x 10  Standing  3# 3 x 10  Standing   3# 3 x 10 Standing  3# 3 x 10  Standing, 3# 3 x 10      Standing  3# 3 x 10   Prone rows Prone   5# 3 x 10 R Prone  6# 3 x 10 R Bentover row  7# 3 x 8 R Bentover row  7# 3 x 10 R      Prone 7# 3 x 10  Cable row  7# unilateral  3 x 10   Scapular retractions Black, elbows flexed 3 x 10 B Cable row, 10# 2 x 10 R   Cable row  10# 3 x 10  Cable row  10# 3 x 10  Cable row  10# x 30 Cable row  10# 3 x 10    Lat pulldowns  13# 3 x 10 B  Lat pulldowns  13# 3 x 10 B Lat pulldowns   13# 3 x 10 B   AAROM UE [place] 3 x 10 UE [place] 3 x 10    Wall slides in forearm neutral, 3 x 5  Wall slides in forearm neutral 3 x 8    UE [place] 3 x10  UE [place] 3 x 10     Wall slides, forearm neutral 3 x 8 UE [place] 2 x 10     Wall slides, forearm neutral 3 x 8 UE [place] 3 x 10    Wall slides forearm neutral 3 x 10  Resisted wall slides,, green 3 x 10   Behind-the-back stretch strap, 10 x 5\"  Resisted wall slides, black, 3 x 10    Mid-trap  Prone  0# 2 x 10 Side-lying isometrics  3 x 6   Prone  0# 3 x 10 Prone  3# 3 x 8 Prone  3# Prone  3# 3 x 10 Prone  3# 3 x 10  Prone  3# 3 x 10 Standing, green, B 3 x 10 Standing, green B 3 x 10    Lower trap Prone  0# 2 x 10  Side-lying isometrics   3 x 5  Prone  0# 3 x 10  Prone  1# 3 x 10 Prone  1#  Prone   3# 3 x 10 Prone  3# 3 x 10  Prone  3# 3 x 10  Standing Ys 3 x 10, red B   Bicep curls       5# 3 x 10       Push ups        3 x 10 , wall push ups  To mat table, 2 x 10  To mat table 2 x 10   AROM         Behind-the-back flexbar passes, x 20 Passing flexbar behind back, x 20           HEP: none added today. Treatment/Session Assessment:    · Response to Treatment:  Continues to demonstrate progress with R shoulder range of motion and R upper extremity strengthening. Continues to experience some pain anteriorly with supine presses. · Compliance with Program/Exercises: Compliant  · Recommendations/Intent for next treatment session: \"Next visit will focus on improving strength and function of R UE\".    Total Treatment Duration: 40 Minutes  PT Patient Time In/Time Out  Time In: 1500  Time Out: [address], PT

## 2019-04-01 ENCOUNTER — HOSPITAL ENCOUNTER (OUTPATIENT)
Dept: PHYSICAL THERAPY | Age: 29
Discharge: HOME | End: 2019-04-01
Payer: COMMERCIAL

## 2019-04-01 PROCEDURE — 97110 THERAPEUTIC EXERCISES: CPT

## 2019-04-01 PROCEDURE — 97140 MANUAL THERAPY 1/> REGIONS: CPT

## 2019-04-01 NOTE — PROGRESS NOTES
[employer]  : 1990  Payor: Tawnya Jimenez / Plan: Mitchell Mendoza / Product Type: Workers Comp /  [address]  [facility], [address] [place] [address]  [phone]   [phone]       OUTPATIENT PHYSICAL THERAPY:Daily Note and Progress Report 2019     ICD-10: Treatment Diagnosis: Pain in right shoulder (M25.511), Stiffness of right shoulder, not elsewhere classified (M25.611)  Precautions/Allergies:   Patient has no known allergies. Fall Risk Score: 0 (? 5 = High Risk)  MD Orders: Evaluate and treat, strengthening, range of motion, home exercise program, \"\"full motion, full strength, no restrictions\"  MEDICAL/REFERRING DIAGNOSIS:  S/P RT Shoulder Arthroscopy, Arthroscopic distal clavicle resection   DATE OF ONSET: 18 (surgery), 3/6/18 (injury)  REFERRING PHYSICIAN: Mary Pemberton MD  RETURN PHYSICIAN APPOINTMENT: 5 weeks (~19)     PROGRESS ASSESSMENT (19 to 19):  Ms. Banda  presents approximately 3 months s/p R shoulder arthroscopy with bursectomy and distal clavicle resection performed. Patient has made excellent progress with forward elevation AROM, but remains limited with abduction AROM. Patient has passive range of motion that is within normal limits, but remains limited with active movements secondary to anterior shoulder pain. Patient is a good candidate to continue with PT for continued progress with strength and AROM. PROBLEM LIST (Impacting functional limitations):  1. Decreased [place] with Home Exercise Program  2. Post-op R shoulder pain and swelling  3. Decreased R shoulder ROM   4. Weakness R shoulder INTERVENTIONS PLANNED:  1. Thermal and electric modalities, manual therapies for pain   2. Manual therapies, therapeutic exercises, HEP for ROM    3. Therapeutic exercises and HEP for strength   TREATMENT PLAN:  Effective Dates: 2019 TO 2019.  Frequency/Duration: 1-2 visits per week for 8 weeks (in anticipation of additional visits ordered by physician at follow up appointments)  GOALS: (Goals have been discussed and agreed upon with patient.)  Short-Term Functional Goals: Time Frame: 4 weeks  1. Report no more than 2-3/10 intermittent pain to R shoulder with compensatory use during basic functional activities, and score less than 60% on the DASH. Ongoing, progressing 4/1/19  2. R shoulder PROM forward elevation greater than 160 degrees and external rotation greater than 90 degrees to progress into functional ranges. Met for forward elevation, but ongoing for ER 4-1-19  3. Demonstrate good R shoulder isometric strength with manual testing to progress into strength phase. MET 1-8-19  4. Independent with initial HEP. Discharge Goals: Time Frame: 8 weeks  1. No more than 2-3/10 intermittent pain R shoulder with return to normalized household and work activities, and score less than 20% on the DASH. 2. R shoulder AROM forward elevation greater than 160 degrees, active external rotation greater than T1-2 with functional reaching, and strength to shoulder are grossly WNL's for safe use with normalized activities. Ongoing, progressing 4/2/19  3. Demonstrate good functional shoulder strength and endurance for return to normalized household and work activities. 4. Independent with advanced shoulder HEP for continued self-management. Rehabilitation Potential For Stated Goals: GOOD                The information in this section was collected on 12-13-18 (except where otherwise noted). HISTORY:   History of Present Injury/Illness (Reason for Referral): Pt reports that she injured her R shoulder while at work on March 6, 2018 when she lifted a box and felt sudden pain in her R shoulder. Attended 12 visits of physical therapy with no relief, and underwent surgery on 12/6/18. Past Medical History/Comorbidities: Ms. Gabi Zurita  has a past medical history of Asthma, BMI 50.0-59.9, adult ([address] and Former smoker.  She also has no past medical history of Adverse effect of anesthesia, Aneurysm ([address] Arrhythmia, Arthritis, Autoimmune disease ([address] CAD (coronary artery disease), Cancer ([address] Chronic kidney disease, Chronic obstructive pulmonary disease ([address] Chronic pain, Coagulation disorder ([address] Diabetes ([address] Difficult intubation, Endocarditis, GERD (gastroesophageal reflux disease), Heart failure ([address] Hypertension, Ill-defined condition, Liver disease, Malignant hyperthermia due to anesthesia, Nausea & vomiting, Nicotine vapor product user, Non-nicotine vapor product user, Pseudocholinesterase deficiency, Psychiatric disorder, PUD (peptic ulcer disease), Rheumatic fever, Seizures ([address] Sleep apnea, Stroke ([address] Thromboembolus ([address] or Thyroid disease. Ms. Lawrence Horner  has a past surgical history that includes hx dilation and curettage (2017). Social History/Living Environment:  Patient lives alone in a 1-story house usually, but is staying with her mother and siblings in a two-story house while recovering from this surgery. Prior Level of Function/Work/Activity: Patient previously worked full-time for [employer] [facility] [employer]. 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 Dominant Side:         RIGHT  Current Medications:     -Promethazine 25 mg - nausea  - Ketorolac 10 MG - pain  - Hydromorphone 2 MG - pain  - Temazepam 15 MG - sleeping  - Lyrica - but has not been usin  - Takes an anti-inflammatory and a cream for pain and stiffness  - Tamodol  -Ambien  - Prednisone and another medication that she cannot recall, prescribed 2-19-19  - \"Water pill\" added at last MD visit   Date Last Reviewed:  2-27-19   EXAMINATION:   4-1-19  Observation/Orthostatic Postural Assessment:  Pt arrived to PT reporting fatigue and that she did not sleep well. Palpation:  Tenderness to R anterior deltoid and R upper trapezius.     ROM:                  R shoulder AROM (standing)    Flexion: 146 degrees ER (reaching behind head) T1-2   ABD: 110 deg   IR (reaching behind back): L4-5    R shoulder PROM (supine)  Flexion 170 degrees   degrees  ER 70 degrees (at 45 deg abduction)              Strength:         R shoulder   flexion 4+/5   ABD 4+/5   ER (elbow at side): 4+/5   IR (elbow at side): 5/5         Special Tests: None  Neurological Screen: Motor and sensory to L UE intact. Functional Mobility:  Sit to/from Stand: independent. Bed Mobility: independent. Independent with basic mobility. Balance:  Not tested        Body Structures Involved:  1. Nerves  2. Bones  3. Joints  4. Muscles Body Functions Affected:  1. Sensory/Pain  2. Neuromusculoskeletal  3. Movement Related  4. Skin Related Activities and Participation Affected:  1. Self Care  2. Interpersonal Interactions and Relationships  3. Community, Social and Civic Life   CLINICAL PRESENTATION:   CLINICAL DECISION MAKING:   Outcome Measure: Tool Used: Disabilities of the Arm, Shoulder and Hand (DASH) Questionnaire - Quick Version  Score:  Initial: 53/55 or 95% limited (12-13-18)  45/55 or 77% limited (Date: 1-8-19 )  44/55 or 75% limited (2-5-19) Most recent:  44/55 or 75% limited (4-1-19)   Interpretation of Score: The DASH is designed to measure the activities of daily living in person's with upper extremity dysfunction or pain. Each section is scored on a 1-5 scale, 5 representing the greatest disability. The scores of each section are added together for a total score of 55. This number is divided by 11, followed by subtracting 1 and multiplying by 25 to get a percent score of disability. This value represents the percentage disability: 0-20% minimal disability; 20-40% moderate disability; 40-60% severe disability; % dependent for care or exaggerated symptom behavior. Minimal detectable change is 12%.     Score 11 12-19 20-28 29-37 38-45 46-54 55   Modifier CH CI CJ CK CL CM CN     Medical Necessity:   · Skilled intervention continues to be required due to R shoulder pain s/p R shoulder arthroscopy. Reason for Services/Other Comments:  · Patient continues to require skilled intervention due to R shoulder pain s/p R shoulder arthroscopy. TREATMENT:   (In addition to Assessment/Re-Assessment sessions the following treatments were rendered)  Pre-treatment Symptoms/Complaints: Reports that she did not sleep well, and wants to resume shooting guns, but R arm prevents her from doing so, and that bothers her. She reports that she is tired. Pain: Initial:   No numeric value reported. States that it hurts \" a little bit\" Post Session:  No VAS after PT. Observation: Patient appeared more fatigued and distracted than at previous appointments, and received text messages during PT which appeared to cause her stress. Manual Therapy (15 minutes) to reduce R shoulder pain and improve motion without discomfort. Grade 3-4 physio mobilizations for forward elevation, abduction, IR and ER to improve range of motion. Soft tissue mobilizations to R upper trapezius in supine. Therapeutic Exercise ( 25 minutes) to improve R upper extremity function. Cues required and provided when necessary. Tactile and verbal cues required as needed to demonstrate appropriate exercise technique. 5 min warm up on upper body ergometer.   .   Date  2-11-19 Date  2-13-19 Date  2-20-19 Date  2-22-19 2-27-19 Date  3-5-19 Date  3-13-19 Date  3-27-19 Date  4-1-19   Activity/Exercise            Flexion 2# 3 x 10  2# 3 x 10 2# 3 x 10  3# 3 x 10 3# 3 x 10  Resisted wall slides, red band 3 x 10  Resisted wall slides, flexion, 3 x 10, blue    Standing, 3# 3 x 10  Standing flexion  3# 3 x 10 Standing flexion, blue 3 x 10    Supine press 3# 3 x 10  3# 3 x 10 4# 3 x 10  4# 3 x 10  5# 3 x 10  5# 3 x 10 5# 3 x 10  6# 2 x 12    External rotations Side-lying   3# 3 x 10      Side-lying  3# 3 x 10  Side-lying  3# 3 x 10  Side-lying  4# 3 x 10    Standing, blue 3 x 10, single band Side-lying  4# 3 x 10     Standing  Side-lying  3# 3 x 10     Standing single band, blue 3 x 10  Side-lying  3# 3 x 10      Single band, blue    Side-lying 4# 3 x 10 Single band, black 3 x 10 L     Side-lying  4# 3 x 10   Abduction Standing, 2# 3 x 10 Standing, 3# 3 x 10  Standing  2# 3 x 10  Standing  3# 3 x 10  Standing   3# 3 x 10 Standing  3# 3 x 10  Standing, 3# 3 x 10      Standing  3# 3 x 10 Standing, blue 3 x 10    Prone rows [place] row  7# 3 x 10 R      Prone 7# 3 x 10  Cable row  7# unilateral  3 x 10 [place] DB row  10# 3 x 10 R   Scapular retractions  Cable row  10# 3 x 10  Cable row  10# 3 x 10  Cable row  10# x 30 Cable row  10# 3 x 10    Lat pulldowns  13# 3 x 10 B  Lat pulldowns  13# 3 x 10 B Lat pulldowns   13# 3 x 10 B Lat pulldowns 13# 3 x 10 B   AAROM UE [place] 3 x 10     Wall slides, forearm neutral 3 x 8 UE [place] 2 x 10     Wall slides, forearm neutral 3 x 8 UE [place] 3 x 10    Wall slides forearm neutral 3 x 10  Resisted wall slides,, green 3 x 10   Behind-the-back stretch strap, 10 x 5\"  Resisted wall slides, black, 3 x 10     Mid-trap  Prone  0# 3 x 10 Prone  3# 3 x 8 Prone  3# Prone  3# 3 x 10 Prone  3# 3 x 10  Prone  3# 3 x 10 Standing, green, B 3 x 10 Standing, green B 3 x 10  Standing, green B 3 x 10    Lower trap Prone  0# 3 x 10  Prone  1# 3 x 10 Prone  1#  Prone   3# 3 x 10 Prone  3# 3 x 10  Prone  3# 3 x 10  Standing Ys 3 x 10, red B    Bicep curls    5# 3 x 10        Push ups     3 x 10 , wall push ups  To mat table, 2 x 10  To mat table 2 x 10 Wall push ups 2x 10   AROM      Behind-the-back flexbar passes, x 20 Passing flexbar behind back, x 20  D1 flexion, green, 3 x 10          HEP: none added today. Treatment/Session Assessment:    · Response to Treatment:  Remains limited by pain, and has regressed with active abduction from previous progress report. Patient has made excellent progress with passive motion but remains limited with active.   · Compliance with Program/Exercises: Compliant  · Recommendations/Intent for next treatment session: \"Next visit will focus on improving strength and function of R UE\".    Total Treatment Duration: 40 Minutes  PT Patient Time In/Time Out  Time In: 1400  Time Out: [facility],

## 2019-04-02 NOTE — PROGRESS NOTES
[employer]  : 1990  Payor: [address] [place] [address] Road / Plan: Radha Cervantes / Product Type: Workers Comp /  [address]  at [facility] [employer]  [address] [place] [address], [address].  [phone]   [phone]       OUTPATIENT PHYSICAL [address]     ICD-10: Treatment Diagnosis: Pain in right shoulder (M25.511), Stiffness of right shoulder, not elsewhere classified (M25.611)  Precautions/Allergies:   Patient has no known allergies. Fall Risk Score: 0 (? 5 = High Risk)  MD Orders: Evaluate and treat, strengthening, range of motion, home exercise program, \"\"full motion, full strength, no restrictions\"  MEDICAL/REFERRING DIAGNOSIS:  S/P RT Shoulder Arthroscopy, Arthroscopic distal clavicle resection   DATE OF ONSET: 18 (surgery), 3/6/18 (injury)  REFERRING PHYSICIAN: Tiffany Bond MD  RETURN PHYSICIAN APPOINTMENT: 5 weeks (~19)     PROGRESS ASSESSMENT (19 to 19):  Ms. Prabhu Lim presents approximately 3 months s/p R shoulder arthroscopy with bursectomy and distal clavicle resection performed. Patient has made excellent progress with forward elevation AROM, but remains limited with abduction AROM. Patient has passive range of motion that is within normal limits, but remains limited with active movements secondary to anterior shoulder pain. Patient is a good candidate to continue with PT for continued progress with strength and AROM. PROBLEM LIST (Impacting functional limitations):  1. Decreased [place] with Home Exercise Program  2. Post-op R shoulder pain and swelling  3. Decreased R shoulder ROM   4. Weakness R shoulder INTERVENTIONS PLANNED:  1. Thermal and electric modalities, manual therapies for pain   2. Manual therapies, therapeutic exercises, HEP for ROM    3. Therapeutic exercises and HEP for strength   TREATMENT PLAN:  Effective Dates: 2019 TO 2019.  Frequency/Duration: 1-2 visits per week for 8 weeks (in anticipation of additional visits ordered by physician at follow up appointments)  GOALS: (Goals have been discussed and agreed upon with patient.)  Short-Term Functional Goals: Time Frame: 4 weeks  1. Report no more than 2-3/10 intermittent pain to R shoulder with compensatory use during basic functional activities, and score less than 60% on the DASH. Ongoing, progressing 4/1/19  2. R shoulder PROM forward elevation greater than 160 degrees and external rotation greater than 90 degrees to progress into functional ranges. Met for forward elevation, but ongoing for ER 4-1-19  3. Demonstrate good R shoulder isometric strength with manual testing to progress into strength phase. MET 1-8-19  4. Independent with initial HEP. Discharge Goals: Time Frame: 8 weeks  1. No more than 2-3/10 intermittent pain R shoulder with return to normalized household and work activities, and score less than 20% on the DASH. 2. R shoulder AROM forward elevation greater than 160 degrees, active external rotation greater than T1-2 with functional reaching, and strength to shoulder are grossly WNL's for safe use with normalized activities. Ongoing, progressing 4/2/19  3. Demonstrate good functional shoulder strength and endurance for return to normalized household and work activities. 4. Independent with advanced shoulder HEP for continued self-management. Rehabilitation Potential For Stated Goals: GOOD    Regarding Antonia Love's therapy, I certify that the treatment plan above will be carried out by a therapist or under their direction. Thank you for this referral,    Ольга Mcclure PT       Referring Physician Signature:     Rahel Du MD          Date                          The information in this section was collected on 12-13-18 (except where otherwise noted).   HISTORY:   History of Present Injury/Illness (Reason for Referral): Pt reports that she injured her R shoulder while at work on March 6, 2018 when she lifted a box and felt sudden pain in her R shoulder. Attended 12 visits of physical therapy with no relief, and underwent surgery on 12/6/18. Past Medical History/Comorbidities: Ms. Kezia Napier  has a past medical history of Asthma, BMI 50.0-59.9, adult ([address] and Former smoker. She also has no past medical history of Adverse effect of anesthesia, Aneurysm ([address] Arrhythmia, Arthritis, Autoimmune disease ([address] CAD (coronary artery disease), Cancer ([address] Chronic kidney disease, Chronic obstructive pulmonary disease ([address] Chronic pain, Coagulation disorder ([address] Diabetes ([address] Difficult intubation, Endocarditis, GERD (gastroesophageal reflux disease), Heart failure ([address] Hypertension, Ill-defined condition, Liver disease, Malignant hyperthermia due to anesthesia, Nausea & vomiting, Nicotine vapor product user, Non-nicotine vapor product user, Pseudocholinesterase deficiency, Psychiatric disorder, PUD (peptic ulcer disease), Rheumatic fever, Seizures ([address] Sleep apnea, Stroke ([address] Thromboembolus ([address] or Thyroid disease. Ms. Kezia Napier  has a past surgical history that includes hx dilation and curettage (2017). Social History/Living Environment:  Patient lives alone in a 1-story house usually, but is staying with her mother and siblings in a two-story house while recovering from this surgery. Prior Level of Function/Work/Activity: Patient previously worked full-time for [employer] at [employer]. 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   Dominant Side:         RIGHT  Current Medications:     -Promethazine 25 mg - nausea  - Ketorolac 10 MG - pain  - Hydromorphone 2 MG - pain  - Temazepam 15 MG - sleeping  - Lyrica - but has not been usin  - Takes an anti-inflammatory and a cream for pain and stiffness  - Tamodol  -Ambien  - Prednisone and another medication that she cannot recall, prescribed 2-19-19  - \"Water pill\" added at last MD visit   Date Last Reviewed:  2-27-19   EXAMINATION: 4-1-19  Observation/Orthostatic Postural Assessment:  Pt arrived to PT reporting fatigue and that she did not sleep well. Palpation:  Tenderness to R anterior deltoid and R upper trapezius. ROM:                  R shoulder AROM (standing)    Flexion: 146 degrees   ER (reaching behind head) T1-2   ABD: 110 deg   IR (reaching behind back): L4-5    R shoulder PROM (supine)  Flexion 170 degrees   degrees  ER 70 degrees (at 45 deg abduction)              Strength:         R shoulder   flexion 4+/5   ABD 4+/5   ER (elbow at side): 4+/5   IR (elbow at side): 5/5         Special Tests: None  Neurological Screen: Motor and sensory to L UE intact. Functional Mobility:  Sit to/from Stand: independent. Bed Mobility: independent. Independent with basic mobility. Balance:  Not tested        Body Structures Involved:  1. Nerves  2. Bones  3. Joints  4. Muscles Body Functions Affected:  1. Sensory/Pain  2. Neuromusculoskeletal  3. Movement Related  4. Skin Related Activities and Participation Affected:  1. Self Care  2. Interpersonal Interactions and Relationships  3. Community, Social and Civic Life   CLINICAL PRESENTATION:   CLINICAL DECISION MAKING:   Outcome Measure: Tool Used: Disabilities of the Arm, Shoulder and Hand (DASH) Questionnaire - Quick Version  Score:  Initial: 53/55 or 95% limited (12-13-18)  45/55 or 77% limited (Date: 1-8-19 )  44/55 or 75% limited (2-5-19) Most recent:  44/55 or 75% limited (4-1-19)   Interpretation of Score: The DASH is designed to measure the activities of daily living in person's with upper extremity dysfunction or pain. Each section is scored on a 1-5 scale, 5 representing the greatest disability. The scores of each section are added together for a total score of 55. This number is divided by 11, followed by subtracting 1 and multiplying by 25 to get a percent score of disability.  This value represents the percentage disability: 0-20% minimal disability; 20-40% moderate disability; 40-60% severe disability; % dependent for care or exaggerated symptom behavior. Minimal detectable change is 12%. Score 11 12-19 20-28 29-37 38-45 46-54 55   Modifier CH CI CJ CK CL CM CN     Medical Necessity:   · Skilled intervention continues to be required due to R shoulder pain s/p R shoulder arthroscopy. Reason for Services/Other Comments:  · Patient continues to require skilled intervention due to R shoulder pain s/p R shoulder arthroscopy.

## 2019-04-03 ENCOUNTER — HOSPITAL ENCOUNTER (OUTPATIENT)
Dept: PHYSICAL THERAPY | Age: 29
Discharge: HOME | End: 2019-04-03
Payer: COMMERCIAL

## 2019-04-03 PROCEDURE — 97110 THERAPEUTIC EXERCISES: CPT

## 2019-04-03 PROCEDURE — 97140 MANUAL THERAPY 1/> REGIONS: CPT

## 2019-04-03 NOTE — PROGRESS NOTES
[employer] : 1990 Payor: [address] [place] [id number] Road / Plan: Anabelle Veliz / Product Type: [employer] /  [address] [place]  at [facility] CAIO MISTRY [address] [facility], [address] [place] [address],[address], [facility] [address] [phone]   [phone] OUTPATIENT PHYSICAL THERAPY:Daily Note 4/3/2019 ICD-10: Treatment Diagnosis: Pain in right shoulder (M25.511), Stiffness of right shoulder, not elsewhere classified (M25.611) Precautions/Allergies:  
Patient has no known allergies. Fall Risk Score: 0 (? 5 = High Risk) MD Orders: Evaluate and treat, strengthening, range of motion, home exercise program, \"\"full motion, full strength, no restrictions\"  MEDICAL/REFERRING DIAGNOSIS: 
S/P RT Shoulder Arthroscopy, Arthroscopic distal clavicle resection DATE OF ONSET: 17 (surgery), 3/6/18 (injury) REFERRING PHYSICIAN: Kendall Traylor MD 
RETURN PHYSICIAN APPOINTMENT: 5 weeks (~19) PROGRESS ASSESSMENT (19 to 19):  Ms. Erica Marion presents approximately 3 months s/p R shoulder arthroscopy with bursectomy and distal clavicle resection performed. Patient has made excellent progress with forward elevation AROM, but remains limited with abduction AROM. Patient has passive range of motion that is within normal limits, but remains limited with active movements secondary to anterior shoulder pain. Patient is a good candidate to continue with PT for continued progress with strength and AROM. PROBLEM LIST (Impacting functional limitations): 1. Decreased [place] with Home Exercise Program 
2. Post-op R shoulder pain and swelling 3. Decreased R shoulder ROM 4. Weakness R shoulder INTERVENTIONS PLANNED: 
1. Thermal and electric modalities, manual therapies for pain 2. Manual therapies, therapeutic exercises, HEP for ROM 3. Therapeutic exercises and HEP for strength TREATMENT PLAN: 
Effective Dates: 2019 TO 2019.  Frequency/Duration: 1-2 visits per week for 8 weeks (in anticipation of additional visits ordered by physician at follow up appointments) GOALS: (Goals have been discussed and agreed upon with patient.) Short-Term Functional Goals: Time Frame: 4 weeks 1. Report no more than 2-3/10 intermittent pain to R shoulder with compensatory use during basic functional activities, and score less than 60% on the DASH. Ongoing, progressing 4/1/19 2. R shoulder PROM forward elevation greater than 160 degrees and external rotation greater than 90 degrees to progress into functional ranges. Met for forward elevation, but ongoing for ER 4-1-19 3. Demonstrate good R shoulder isometric strength with manual testing to progress into strength phase. MET 1-8-19 
4. Independent with initial HEP. Discharge Goals: Time Frame: 8 weeks 1. No more than 2-3/10 intermittent pain R shoulder with return to normalized household and work activities, and score less than 20% on the DASH. 2. R shoulder AROM forward elevation greater than 160 degrees, active external rotation greater than T1-2 with functional reaching, and strength to shoulder are grossly WNL's for safe use with normalized activities. Ongoing, progressing 4/2/19 3. Demonstrate good functional shoulder strength and endurance for return to normalized household and work activities. 4. Independent with advanced shoulder HEP for continued self-management. Rehabilitation Potential For Stated Goals: GOOD The information in this section was collected on 12-13-18 (except where otherwise noted). HISTORY:  
History of Present Injury/Illness (Reason for Referral): Pt reports that she injured her R shoulder while at work on March 6, 2018 when she lifted a box and felt sudden pain in her R shoulder. Attended 12 visits of physical therapy with no relief, and underwent surgery on 12/6/18.  
Past Medical History/Comorbidities: Ms. Jonel Be  has a past medical history of Asthma, BMI 50.0-59.9, adult ([address] and Former smoker. She also has no past medical history of Adverse effect of anesthesia, Aneurysm ([address] Arrhythmia, Arthritis, Autoimmune disease ([address] CAD (coronary artery disease), Cancer ([address] Chronic kidney disease, Chronic obstructive pulmonary disease ([address] Chronic pain, Coagulation disorder ([address] Diabetes ([address] Difficult intubation, Endocarditis, GERD (gastroesophageal reflux disease), Heart failure ([address] Hypertension, Ill-defined condition, Liver disease, Malignant hyperthermia due to anesthesia, Nausea & vomiting, Nicotine vapor product user, Non-nicotine vapor product user, Pseudocholinesterase deficiency, Psychiatric disorder, PUD (peptic ulcer disease), Rheumatic fever, Seizures ([address] Sleep apnea, Stroke ([address] Thromboembolus ([address] or Thyroid disease. Ms. Carri Benites  has a past surgical history that includes hx dilation and curettage (2017). Social History/Living Environment:  Patient lives alone in a 1-story house usually, but is staying with her mother and siblings in a two-story house while recovering from this surgery. Prior Level of Function/Work/Activity: Patient previously worked full-time for [employer] at [employer]. 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 Dominant Side:  
      RIGHT Current Medications:   
 -Promethazine 25 mg - nausea - Ketorolac 10 MG - pain - Hydromorphone 2 MG - pain - Temazepam 15 MG - sleeping - Lyrica - but has not been usin - Takes an anti-inflammatory and a cream for pain and stiffness - Tamodol 
-Ambien - Prednisone and another medication that she cannot recall, prescribed 2-19-19 
- \"Water pill\" added at last MD visit Date Last Reviewed:  2-27-19 EXAMINATION:  
4-1-19 Observation/Orthostatic Postural Assessment:  Pt arrived to PT reporting fatigue and that she did not sleep well. Palpation:  Tenderness to R anterior deltoid and R upper trapezius.    
ROM:  
 R shoulder AROM (standing) Flexion: 146 degrees ER (reaching behind head) T1-2 ABD: 110 deg 
 IR (reaching behind back): L4-5 
 
R shoulder PROM (supine) Flexion 170 degrees  degrees ER 70 degrees (at 45 deg abduction) Strength:  
  
   R shoulder 
 flexion 4+/5 ABD 4+/5 ER (elbow at side): 4+/5 IR (elbow at side): 5/5 Special Tests: None Neurological Screen: Motor and sensory to L UE intact. Functional Mobility:  Sit to/from Stand: independent. Bed Mobility: independent. Independent with basic mobility. Balance:  Not tested Body Structures Involved: 1. Nerves 2. Bones 3. Joints 4. Muscles Body Functions Affected: 1. Sensory/Pain 2. Neuromusculoskeletal 
3. Movement Related 4. Skin Related Activities and Participation Affected: 1. Self Care 2. Interpersonal Interactions and Relationships 3. Community, Social and [place] CLINICAL PRESENTATION:  
CLINICAL DECISION MAKING:  
Outcome Measure: Tool Used: Disabilities of the Arm, Shoulder and Hand (DASH) Questionnaire - Quick Version Score:  Initial: 53/55 or 95% limited (12-13-18)  45/55 or 77% limited (Date: 1-8-19 )  44/55 or 75% limited (2-5-19) Most recent:  44/55 or 75% limited (4-1-19) Interpretation of Score: The DASH is designed to measure the activities of daily living in person's with upper extremity dysfunction or pain. Each section is scored on a 1-5 scale, 5 representing the greatest disability. The scores of each section are added together for a total score of 55. This number is divided by 11, followed by subtracting 1 and multiplying by 25 to get a percent score of disability. This value represents the percentage disability: 0-20% minimal disability; 20-40% moderate disability; 40-60% severe disability; % dependent for care or exaggerated symptom behavior. Minimal detectable change is 12%. Score 11 12-19 20-28 29-37 38-45 46-54 55 Modifier CH CI CJ CK CL CM CN Medical Necessity:  
· Skilled intervention continues to be required due to R shoulder pain s/p R shoulder arthroscopy. Reason for Services/Other Comments: 
· Patient continues to require skilled intervention due to R shoulder pain s/p R shoulder arthroscopy. TREATMENT:  
(In addition to Assessment/Re-Assessment sessions the following treatments were rendered) Pre-treatment Symptoms/Complaints: Reports that her arm feels better than it did at her last appointment. Pain: Initial:   No VAS Post Session:  No VAS after PT. Manual Therapy (0 minutes) none today Therapeutic Exercise (  40 minutes) to improve R upper extremity function. Cues required and provided when necessary. Tactile and verbal cues required as needed to demonstrate appropriate exercise technique. 5 min warm up on upper body ergometer. . 
 Date 2-11-19 Date 2-13-19 Date 2-20-19 Date 
2-22-19 2-27-19 Date 
3-5-19 Date 3-13-19 Date 3-27-19 Date 4-1-19 Date 4-3-19 Activity/Exercise Flexion 2# 3 x 10  2# 3 x 10 2# 3 x 10  3# 3 x 10 3# 3 x 10  Resisted wall slides, red band 3 x 10  Resisted wall slides, flexion, 3 x 10, blue Standing, 3# 3 x 10  Standing flexion 3# 3 x 10 Standing flexion, blue 3 x 10  Standing flexion, blue band 3 x 10 Supine press 3# 3 x 10  3# 3 x 10 4# 3 x 10  4# 3 x 10  5# 3 x 10  5# 3 x 10 5# 3 x 10  6# 2 x 12  6# 3 x 12 R External rotations Side-lying 3# 3 x 10 Side-lying 3# 3 x 10  Side-lying 3# 3 x 10  Side-lying 4# 3 x 10 Standing, blue 3 x 10, single band Side-lying 4# 3 x 10 Standing  Side-lying 3# 3 x 10 Standing single band, blue 3 x 10  Side-lying 3# 3 x 10 Single band, blue Side-lying 4# 3 x 10 Single band, black 3 x 10 L Side-lying 4# 3 x 10 Side-lying 4# 3 x 10 Abduction Standing, 2# 3 x 10 Standing, 3# 3 x 10  Standing 2# 3 x 10  Standing 3# 3 x 10  Standing 3# 3 x 10 Standing 3# 3 x 10  Standing, 3# 3 x 10 Standing 3# 3 x 10 Standing, blue 3 x 10  Standing, blue band 3 x 10 R Prone rows [place] row 7# 3 x 10 R      Prone 7# 3 x 10  Cable row 7# unilateral 
3 x 10 [place] DB row 10# 3 x 10 R [place] DB row 10# 3 x 10 Scapular retractions  Cable row 10# 3 x 10  Cable row 10# 3 x 10  Cable row 10# x 30 Cable row 10# 3 x 10 Lat pulldowns 13# 3 x 10 B  Lat pulldowns 13# 3 x 10 B Lat pulldowns 13# 3 x 10 B Lat pulldowns 13# 3 x 10 B Lat pulldowns 17# 3 x 10 Black, bilateral shoulder extensions 3 x 10 B  
AAROM UE [place] 3 x 10 Wall slides, forearm neutral 3 x 8 UE [place] 2 x 10 Wall slides, forearm neutral 3 x 8 UE [place] 3 x 10 Wall slides forearm neutral 3 x 10  Resisted wall slides,, green 3 x 10   Behind-the-back stretch strap, 10 x 5\"  Resisted wall slides, black, 3 x 10 Mid-trap  Prone 0# 3 x 10 Prone 3# 3 x 8 Prone 3# Prone 3# 3 x 10 Prone 3# 3 x 10  Prone 3# 3 x 10 Standing, green, B 3 x 10 Standing, green B 3 x 10  Standing, green B 3 x 10  Standing, green B 3 x 10 Lower trap Prone 0# 3 x 10  Prone 1# 3 x 10 Prone 1#  Prone 3# 3 x 10 Prone 3# 3 x 10  Prone 3# 3 x 10  Standing Ys 3 x 10, red B Bicep curls    5# 3 x 10 Push ups     3 x 10 , wall push ups  To mat table, 2 x 10  To mat table 2 x 10 Wall push ups 2x 10 Wall push ups 2 x 10 AROM      Behind-the-back flexbar passes, x 20 Passing flexbar behind back, x 20  D1 flexion, green, 3 x 10 Band-resisted shoulder press Green, 3 x 12 R  
  
 
 
HEP: none added today. Treatment/Session Assessment:   
· Response to Treatment:  Improved performance with therapeutic exercises today, and much less inhibited by pain today. Continues to demonstrate progress with AROM and strength. · Compliance with Program/Exercises: Compliant · Recommendations/Intent for next treatment session: \"Next visit will focus on improving strength and function of R UE\". Total Treatment Duration: 40 Minutes PT Patient Time In/Time Out Time In: 5578 Time Out: 4358 Brian Rothman, PT

## 2019-04-08 ENCOUNTER — HOSPITAL ENCOUNTER (OUTPATIENT)
Dept: PHYSICAL THERAPY | Age: 29
Discharge: HOME | End: 2019-04-08
Payer: COMMERCIAL

## 2019-04-08 NOTE — PROGRESS NOTES
[employer] : 1990 Payor: [address] [place] [address] Road / Plan:  [place] / Product Type: Workers Comp /  [address] [place]  at [facility] CAIO MISTRY [address] [facility], [address] [place] [address] [address], [address] [facility] [phone]   [phone] OUTPATIENT PHYSICAL THERAPY:Cancellation note 2019 ICD-10: Treatment Diagnosis: Pain in right shoulder (M25.511), Stiffness of right shoulder, not elsewhere classified (M25.611) Precautions/Allergies:  
Patient has no known allergies. Fall Risk Score: 0 (? 5 = High Risk) MD Orders: Evaluate and treat, strengthening, range of motion, home exercise program, \"\"full motion, full strength, no restrictions\"  MEDICAL/REFERRING DIAGNOSIS: 
S/P RT Shoulder Arthroscopy, Arthroscopic distal clavicle resection DATE OF ONSET: 17 (surgery), 3/6/18 (injury) REFERRING PHYSICIAN: Jesus Traylor MD 
RETURN PHYSICIAN APPOINTMENT: 5 weeks (~19) Ms. Jennifer Joe cancelled today's appointment. PT will resume plan of care at next scheduled appointment. James Thao, PT 
2019

## 2019-04-10 ENCOUNTER — APPOINTMENT (OUTPATIENT)
Dept: PHYSICAL THERAPY | Age: 29
End: 2019-04-10
Payer: COMMERCIAL

## 2019-04-15 ENCOUNTER — APPOINTMENT (OUTPATIENT)
Dept: PHYSICAL THERAPY | Age: 29
End: 2019-04-15
Payer: COMMERCIAL

## 2019-04-17 ENCOUNTER — APPOINTMENT (OUTPATIENT)
Dept: PHYSICAL THERAPY | Age: 29
End: 2019-04-17
Payer: COMMERCIAL

## 2019-04-22 ENCOUNTER — APPOINTMENT (OUTPATIENT)
Dept: PHYSICAL THERAPY | Age: 29
End: 2019-04-22
Payer: COMMERCIAL

## 2019-04-24 ENCOUNTER — APPOINTMENT (OUTPATIENT)
Dept: PHYSICAL THERAPY | Age: 29
End: 2019-04-24
Payer: COMMERCIAL

## 2019-04-29 ENCOUNTER — APPOINTMENT (OUTPATIENT)
Dept: PHYSICAL THERAPY | Age: 29
End: 2019-04-29
Payer: COMMERCIAL

## 2019-05-01 ENCOUNTER — APPOINTMENT (OUTPATIENT)
Dept: PHYSICAL THERAPY | Age: 29
End: 2019-05-01

## 2019-05-08 ENCOUNTER — FOLLOW UP ESTABLISHED (OUTPATIENT)
Dept: URBAN - NONMETROPOLITAN AREA CLINIC 6 | Facility: CLINIC | Age: 29
End: 2019-05-08
Payer: COMMERCIAL

## 2019-05-08 PROCEDURE — 92014 COMPRE OPH EXAM EST PT 1/>: CPT | Performed by: OPTOMETRIST

## 2019-05-08 PROCEDURE — 92004 COMPRE OPH EXAM NEW PT 1/>: CPT | Performed by: OPTOMETRIST

## 2019-05-08 PROCEDURE — 92015 DETERMINE REFRACTIVE STATE: CPT | Performed by: OPTOMETRIST

## 2019-05-08 ASSESSMENT — VISUAL ACUITY
OS: 20/20
OD: 20/20

## 2019-05-08 ASSESSMENT — INTRAOCULAR PRESSURE
OD: 15
OS: 15

## 2021-10-20 ENCOUNTER — OFFICE VISIT (OUTPATIENT)
Dept: URBAN - NONMETROPOLITAN AREA CLINIC 6 | Facility: CLINIC | Age: 31
End: 2021-10-20
Payer: COMMERCIAL

## 2021-10-20 PROCEDURE — 92014 COMPRE OPH EXAM EST PT 1/>: CPT | Performed by: OPTOMETRIST

## 2021-10-20 ASSESSMENT — VISUAL ACUITY
OD: 20/20
OS: 20/20

## 2021-10-20 ASSESSMENT — INTRAOCULAR PRESSURE
OS: 14
OD: 14

## 2021-10-20 NOTE — IMPRESSION/PLAN
Impression: Myopia, bilateral: H52.13. Plan: Glasses Rx and Contact lens Rx updated. Discussed proper wearing schedule and replacement of Contact lenses. [employer] Monthly trials dispensed in office.

## 2022-10-21 ENCOUNTER — OFFICE VISIT (OUTPATIENT)
Dept: URBAN - NONMETROPOLITAN AREA CLINIC 6 | Facility: CLINIC | Age: 32
End: 2022-10-21
Payer: COMMERCIAL

## 2022-10-21 DIAGNOSIS — H52.13 MYOPIA, BILATERAL: Primary | ICD-10-CM

## 2022-10-21 PROCEDURE — 92014 COMPRE OPH EXAM EST PT 1/>: CPT | Performed by: OPTOMETRIST

## 2022-10-21 ASSESSMENT — VISUAL ACUITY
OS: 20/20
OD: 20/20

## 2022-10-21 ASSESSMENT — INTRAOCULAR PRESSURE
OD: 16
OS: 16

## 2023-02-24 NOTE — BRIEF OP NOTE
BRIEF OPERATIVE NOTE Date of Procedure: 12/7/2018 Preoperative Diagnosis:  PARTIAL THICKNESS ROTATOR CUFF TEAR RIGHT SHOULDER 
    BICEPITAL STRAIN RIGHT SHOULDER 
    SLAP TEAR RIGHT SHOULDER 
    AC JOINT SPRAIN SPRAIN RIGHT SHOULDER Postoperative Diagnosis: ROTATOR CUFF TENDINITIS RIGHT SHOULDER 
    AC JOINT SPRAIN RIGHT SHOULDER Procedure(s): ARTHROSCOPY RIGHT SHOULDER ARTHROSCOPIC DISTAL CLAVICLE RESECTION, EXTENSIVE DEBRIDEMENT GLENOHUMERAL JOINT, SUBACROMIAL SPACE Surgeon(s) and Role: * Piper Traylor MD - Primary Assistant Staff:  Vania Stevenson CFA Surgical Staff: 
Circ-1: (Unknown) Scrub Tech-1: (Unknown) Scrub Tech-2: (Unknown) Scrub Tech-3: (Unknown) No case tracking events are documented in the log. Anesthesia:  GENERAL WITH INTERSCALENE BLOCK Estimated Blood Loss: 10 CC. Complications: NONE Maricarmen Bradley MD 
 
 
 
 
 
 
 
 
 58

## 2023-10-24 ENCOUNTER — OFFICE VISIT (OUTPATIENT)
Dept: URBAN - NONMETROPOLITAN AREA CLINIC 6 | Facility: CLINIC | Age: 33
End: 2023-10-24
Payer: COMMERCIAL

## 2023-10-24 DIAGNOSIS — H52.13 MYOPIA, BILATERAL: Primary | ICD-10-CM

## 2023-10-24 PROCEDURE — 92014 COMPRE OPH EXAM EST PT 1/>: CPT | Performed by: OPTOMETRIST

## 2023-10-24 ASSESSMENT — INTRAOCULAR PRESSURE
OS: 16
OD: 18

## 2023-10-24 ASSESSMENT — VISUAL ACUITY
OS: 20/20
OD: 20/20

## 2024-10-24 ENCOUNTER — OFFICE VISIT (OUTPATIENT)
Dept: URBAN - NONMETROPOLITAN AREA CLINIC 6 | Facility: CLINIC | Age: 34
End: 2024-10-24
Payer: COMMERCIAL

## 2024-10-24 DIAGNOSIS — H52.223 REGULAR ASTIGMATISM, BILATERAL: ICD-10-CM

## 2024-10-24 DIAGNOSIS — H52.13 MYOPIA, BILATERAL: Primary | ICD-10-CM

## 2024-10-24 PROCEDURE — 92014 COMPRE OPH EXAM EST PT 1/>: CPT | Performed by: OPTOMETRIST

## 2024-10-24 ASSESSMENT — VISUAL ACUITY
OS: 20/30
OD: 20/25

## 2024-10-24 ASSESSMENT — INTRAOCULAR PRESSURE
OD: 16
OS: 15

## (undated) DEVICE — DRAPE,U/SHT,SPLIT,FILM,60X84,STERILE: Brand: MEDLINE

## (undated) DEVICE — [RESECTOR CUTTER, ARTHROSCOPIC SHAVER BLADE,  DO NOT RESTERILIZE,  DO NOT USE IF PACKAGE IS DAMAGED,  KEEP DRY,  KEEP AWAY FROM SUNLIGHT]: Brand: FORMULA

## (undated) DEVICE — GOWN,REINFORCED,POLY,AURORA,XXLARGE,STR: Brand: MEDLINE

## (undated) DEVICE — 3M™ STERI-DRAPE™ INCISE DRAPE 1050 (60CM X 45CM): Brand: STERI-DRAPE™

## (undated) DEVICE — SURGICAL PROCEDURE PACK BASIC ST FRANCIS

## (undated) DEVICE — MEDI-VAC YANKAUER SUCTION HANDLE W/BULBOUS TIP: Brand: CARDINAL HEALTH

## (undated) DEVICE — SHOULDER SUSPENSION KIT 6 PER BOX

## (undated) DEVICE — 90-S, SUCTION PROBE, NON-BENDABLE, MAX CUT LEVEL 11: Brand: SERFAS ENERGY

## (undated) DEVICE — SOLUTION IRRIG 3000ML 0.9% SOD CHL FLX CONT 0797208] ICU MEDICAL INC]

## (undated) DEVICE — ABDOMINAL PAD: Brand: DERMACEA

## (undated) DEVICE — SUTURE ETHLN SZ 2-0 L18IN NONABSORBABLE BLK L26MM PS 3/8 585H

## (undated) DEVICE — OUTFLOW CASSETTE TUBING, DO NOT USE IF PACKAGE IS DAMAGED: Brand: CROSSFLOW

## (undated) DEVICE — MEDI-VAC NON-CONDUCTIVE SUCTION TUBING: Brand: CARDINAL HEALTH

## (undated) DEVICE — PACK,SHOULDER,DRAPE,POUCH: Brand: MEDLINE

## (undated) DEVICE — PUDDLEVAC FLOOR SUCTION DEVICE: Brand: PUDDLEVAC

## (undated) DEVICE — SLING ARM SWTH UNIV FOAM 1 SZ FITS MOST

## (undated) DEVICE — BUR SHV CUT HLLW 6 FLUT 5.5MM --

## (undated) DEVICE — NDL SPNE QNCKE 18GX3.5IN LF --

## (undated) DEVICE — BLADE SHV CUT MENIS AGG + 4MM --

## (undated) DEVICE — INFLOW CASSETTE TUBING, DO NOT USE IF PACKAGE IS DAMAGED: Brand: CROSSFLOW

## (undated) DEVICE — (D)PREP SKN CHLRAPRP APPL 26ML -- CONVERT TO ITEM 371833

## (undated) DEVICE — CARDINAL HEALTH FLEXIBLE LIGHT HANDLE COVER: Brand: CARDINAL HEALTH

## (undated) DEVICE — Z DISCONTINUED PER MEDLINE DRESSING ALG W9XL10CM SIL CVR WTRPRF VIR BACT BARR ANTIMIC